# Patient Record
Sex: MALE | Race: WHITE | NOT HISPANIC OR LATINO | Employment: OTHER | URBAN - METROPOLITAN AREA
[De-identification: names, ages, dates, MRNs, and addresses within clinical notes are randomized per-mention and may not be internally consistent; named-entity substitution may affect disease eponyms.]

---

## 2023-02-21 LAB
CHOLESTEROL (MG/DL) IN SER/PLAS: 144 MG/DL (ref 0–199)
CHOLESTEROL IN HDL (MG/DL) IN SER/PLAS: 32.3 MG/DL
CHOLESTEROL/HDL RATIO: 4.5
LDL: 87 MG/DL (ref 0–99)
TRIGLYCERIDE (MG/DL) IN SER/PLAS: 123 MG/DL (ref 0–149)
VLDL: 25 MG/DL (ref 0–40)

## 2023-02-22 LAB
ESTIMATED AVERAGE GLUCOSE FOR HBA1C: 206 MG/DL
HEMOGLOBIN A1C/HEMOGLOBIN TOTAL IN BLOOD: 8.8 %

## 2023-09-12 LAB
ALANINE AMINOTRANSFERASE (SGPT) (U/L) IN SER/PLAS: 16 U/L (ref 10–52)
ALBUMIN (G/DL) IN SER/PLAS: 4.2 G/DL (ref 3.4–5)
ALKALINE PHOSPHATASE (U/L) IN SER/PLAS: 60 U/L (ref 33–136)
ANION GAP IN SER/PLAS: 10 MMOL/L (ref 10–20)
ASPARTATE AMINOTRANSFERASE (SGOT) (U/L) IN SER/PLAS: 16 U/L (ref 9–39)
BILIRUBIN TOTAL (MG/DL) IN SER/PLAS: 0.6 MG/DL (ref 0–1.2)
CALCIDIOL (25 OH VITAMIN D3) (NG/ML) IN SER/PLAS: 58 NG/ML
CALCIUM (MG/DL) IN SER/PLAS: 9.1 MG/DL (ref 8.6–10.3)
CARBON DIOXIDE, TOTAL (MMOL/L) IN SER/PLAS: 28 MMOL/L (ref 21–32)
CHLORIDE (MMOL/L) IN SER/PLAS: 103 MMOL/L (ref 98–107)
CHOLESTEROL (MG/DL) IN SER/PLAS: 114 MG/DL (ref 0–199)
CHOLESTEROL IN HDL (MG/DL) IN SER/PLAS: 28.5 MG/DL
CHOLESTEROL/HDL RATIO: 4
CREATININE (MG/DL) IN SER/PLAS: 0.81 MG/DL (ref 0.5–1.3)
ERYTHROCYTE DISTRIBUTION WIDTH (RATIO) BY AUTOMATED COUNT: 12.9 % (ref 11.5–14.5)
ERYTHROCYTE MEAN CORPUSCULAR HEMOGLOBIN CONCENTRATION (G/DL) BY AUTOMATED: 32.9 G/DL (ref 32–36)
ERYTHROCYTE MEAN CORPUSCULAR VOLUME (FL) BY AUTOMATED COUNT: 93 FL (ref 80–100)
ERYTHROCYTES (10*6/UL) IN BLOOD BY AUTOMATED COUNT: 4.61 X10E12/L (ref 4.5–5.9)
GFR MALE: 87 ML/MIN/1.73M2
GLUCOSE (MG/DL) IN SER/PLAS: 124 MG/DL (ref 74–99)
HEMATOCRIT (%) IN BLOOD BY AUTOMATED COUNT: 43.1 % (ref 41–52)
HEMOGLOBIN (G/DL) IN BLOOD: 14.2 G/DL (ref 13.5–17.5)
LDL: 70 MG/DL (ref 0–99)
LEUKOCYTES (10*3/UL) IN BLOOD BY AUTOMATED COUNT: 6.2 X10E9/L (ref 4.4–11.3)
PLATELETS (10*3/UL) IN BLOOD AUTOMATED COUNT: 315 X10E9/L (ref 150–450)
POTASSIUM (MMOL/L) IN SER/PLAS: 4.1 MMOL/L (ref 3.5–5.3)
PROTEIN TOTAL: 6.9 G/DL (ref 6.4–8.2)
SODIUM (MMOL/L) IN SER/PLAS: 137 MMOL/L (ref 136–145)
THYROTROPIN (MIU/L) IN SER/PLAS BY DETECTION LIMIT <= 0.05 MIU/L: 1.58 MIU/L (ref 0.44–3.98)
TRIGLYCERIDE (MG/DL) IN SER/PLAS: 76 MG/DL (ref 0–149)
UREA NITROGEN (MG/DL) IN SER/PLAS: 10 MG/DL (ref 6–23)
VLDL: 15 MG/DL (ref 0–40)

## 2023-09-13 LAB
ESTIMATED AVERAGE GLUCOSE FOR HBA1C: 169 MG/DL
HEMOGLOBIN A1C/HEMOGLOBIN TOTAL IN BLOOD: 7.5 %

## 2023-10-20 PROBLEM — R29.898 DECREASED RANGE OF MOTION OF NECK: Status: ACTIVE | Noted: 2023-10-20

## 2023-10-20 PROBLEM — R29.898 WEAKNESS OF EXTREMITY: Status: ACTIVE | Noted: 2023-10-20

## 2023-10-20 PROBLEM — R05.8 PRODUCTIVE COUGH: Status: ACTIVE | Noted: 2023-10-20

## 2023-10-20 RX ORDER — AMOXICILLIN AND CLAVULANATE POTASSIUM 875; 125 MG/1; MG/1
1 TABLET, FILM COATED ORAL EVERY 12 HOURS
COMMUNITY
Start: 2022-11-27 | End: 2023-10-24 | Stop reason: ALTCHOICE

## 2023-10-20 RX ORDER — ALBUTEROL SULFATE 90 UG/1
AEROSOL, METERED RESPIRATORY (INHALATION)
COMMUNITY
Start: 2022-11-27 | End: 2024-01-24 | Stop reason: ALTCHOICE

## 2023-10-24 ENCOUNTER — OFFICE VISIT (OUTPATIENT)
Dept: PRIMARY CARE | Facility: CLINIC | Age: 83
End: 2023-10-24
Payer: MEDICARE

## 2023-10-24 VITALS
DIASTOLIC BLOOD PRESSURE: 70 MMHG | RESPIRATION RATE: 16 BRPM | HEART RATE: 76 BPM | SYSTOLIC BLOOD PRESSURE: 118 MMHG | WEIGHT: 213 LBS | BODY MASS INDEX: 33.43 KG/M2 | HEIGHT: 67 IN | TEMPERATURE: 97.7 F

## 2023-10-24 DIAGNOSIS — E78.5 HYPERLIPIDEMIA, UNSPECIFIED HYPERLIPIDEMIA TYPE: ICD-10-CM

## 2023-10-24 DIAGNOSIS — G30.9 AD (ALZHEIMER'S DISEASE) (MULTI): Primary | ICD-10-CM

## 2023-10-24 DIAGNOSIS — Z95.1 S/P CABG X 3: ICD-10-CM

## 2023-10-24 DIAGNOSIS — E11.9 TYPE 2 DIABETES MELLITUS WITHOUT COMPLICATION, WITHOUT LONG-TERM CURRENT USE OF INSULIN (MULTI): ICD-10-CM

## 2023-10-24 DIAGNOSIS — I10 ESSENTIAL HYPERTENSION: ICD-10-CM

## 2023-10-24 DIAGNOSIS — F02.80 AD (ALZHEIMER'S DISEASE) (MULTI): Primary | ICD-10-CM

## 2023-10-24 DIAGNOSIS — I25.83 CORONARY ATHEROSCLEROSIS DUE TO LIPID RICH PLAQUE: ICD-10-CM

## 2023-10-24 DIAGNOSIS — I25.10 CORONARY ATHEROSCLEROSIS DUE TO LIPID RICH PLAQUE: ICD-10-CM

## 2023-10-24 PROCEDURE — 1159F MED LIST DOCD IN RCRD: CPT | Performed by: FAMILY MEDICINE

## 2023-10-24 PROCEDURE — 1160F RVW MEDS BY RX/DR IN RCRD: CPT | Performed by: FAMILY MEDICINE

## 2023-10-24 PROCEDURE — 3074F SYST BP LT 130 MM HG: CPT | Performed by: FAMILY MEDICINE

## 2023-10-24 PROCEDURE — 99204 OFFICE O/P NEW MOD 45 MIN: CPT | Performed by: FAMILY MEDICINE

## 2023-10-24 PROCEDURE — 3078F DIAST BP <80 MM HG: CPT | Performed by: FAMILY MEDICINE

## 2023-10-24 RX ORDER — ATORVASTATIN CALCIUM 40 MG/1
40 TABLET, FILM COATED ORAL
COMMUNITY
Start: 2019-12-31 | End: 2023-12-06 | Stop reason: SDUPTHER

## 2023-10-24 RX ORDER — ZINC GLUCONATE 50 MG
50 TABLET ORAL
COMMUNITY
End: 2023-10-24 | Stop reason: ALTCHOICE

## 2023-10-24 RX ORDER — RAMIPRIL 10 MG/1
10 CAPSULE ORAL
COMMUNITY
Start: 2018-02-22 | End: 2023-10-26 | Stop reason: SDUPTHER

## 2023-10-24 RX ORDER — ASPIRIN 81 MG/1
81 TABLET ORAL
COMMUNITY

## 2023-10-24 RX ORDER — MEMANTINE HYDROCHLORIDE 10 MG/1
10 TABLET ORAL 2 TIMES DAILY
COMMUNITY
Start: 2023-01-13 | End: 2023-10-26 | Stop reason: SDUPTHER

## 2023-10-24 RX ORDER — METFORMIN HYDROCHLORIDE 1000 MG/1
1000 TABLET ORAL 2 TIMES DAILY
COMMUNITY
Start: 2023-02-16 | End: 2023-12-06 | Stop reason: SDUPTHER

## 2023-10-24 RX ORDER — GLIPIZIDE 10 MG/1
10 TABLET, FILM COATED, EXTENDED RELEASE ORAL
COMMUNITY
Start: 2023-03-07 | End: 2023-12-06 | Stop reason: SDUPTHER
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
## 2023-10-25 ENCOUNTER — TELEPHONE (OUTPATIENT)
Dept: PHYSICAL THERAPY | Facility: CLINIC | Age: 83
End: 2023-10-25

## 2023-10-25 DIAGNOSIS — I10 HYPERTENSION, UNSPECIFIED TYPE: ICD-10-CM

## 2023-10-25 DIAGNOSIS — F03.90 DEMENTIA WITHOUT BEHAVIORAL DISTURBANCE, PSYCHOTIC DISTURBANCE, MOOD DISTURBANCE, OR ANXIETY, UNSPECIFIED DEMENTIA SEVERITY, UNSPECIFIED DEMENTIA TYPE (MULTI): Primary | ICD-10-CM

## 2023-10-25 NOTE — TELEPHONE ENCOUNTER
PATIENTS DAUGHTER CALLED TO LET YOU KNOW HOW MANY PILLS THE PATIENT HAS LEFT      GLIMEPIRIDE - 10mg - has 308 pills left     NAMENDA - 10mg - has 45 pills left     RAMIPRIL - 10mg - has 29 pills left     METFORMIN - 1000mg - has 100 pills left     ATORVASTATIN - 40mg - has 90 pills left

## 2023-10-26 RX ORDER — MEMANTINE HYDROCHLORIDE 10 MG/1
10 TABLET ORAL 2 TIMES DAILY
Qty: 180 TABLET | Refills: 1 | Status: SHIPPED | OUTPATIENT
Start: 2023-10-26 | End: 2023-12-06 | Stop reason: SDUPTHER

## 2023-10-26 RX ORDER — RAMIPRIL 10 MG/1
10 CAPSULE ORAL
Qty: 90 CAPSULE | Refills: 1 | Status: SHIPPED | OUTPATIENT
Start: 2023-10-26 | End: 2023-12-06 | Stop reason: SDUPTHER

## 2023-12-05 ENCOUNTER — PATIENT MESSAGE (OUTPATIENT)
Dept: PRIMARY CARE | Facility: CLINIC | Age: 83
End: 2023-12-05
Payer: MEDICARE

## 2023-12-05 DIAGNOSIS — F03.90 DEMENTIA WITHOUT BEHAVIORAL DISTURBANCE, PSYCHOTIC DISTURBANCE, MOOD DISTURBANCE, OR ANXIETY, UNSPECIFIED DEMENTIA SEVERITY, UNSPECIFIED DEMENTIA TYPE (MULTI): ICD-10-CM

## 2023-12-05 DIAGNOSIS — I10 HYPERTENSION, UNSPECIFIED TYPE: ICD-10-CM

## 2023-12-05 DIAGNOSIS — E11.9 TYPE 2 DIABETES MELLITUS WITHOUT COMPLICATION, WITHOUT LONG-TERM CURRENT USE OF INSULIN (MULTI): Primary | ICD-10-CM

## 2023-12-06 RX ORDER — ATORVASTATIN CALCIUM 40 MG/1
40 TABLET, FILM COATED ORAL
Qty: 90 TABLET | Refills: 1 | Status: SHIPPED | OUTPATIENT
Start: 2023-12-06 | End: 2024-01-25 | Stop reason: SDUPTHER

## 2023-12-06 RX ORDER — METFORMIN HYDROCHLORIDE 1000 MG/1
1000 TABLET ORAL
Qty: 180 TABLET | Refills: 1 | Status: SHIPPED | OUTPATIENT
Start: 2023-12-06 | End: 2024-01-25 | Stop reason: SDUPTHER

## 2023-12-06 RX ORDER — RAMIPRIL 10 MG/1
10 CAPSULE ORAL
Qty: 90 CAPSULE | Refills: 1 | Status: SHIPPED | OUTPATIENT
Start: 2023-12-06 | End: 2024-01-31 | Stop reason: SDUPTHER

## 2023-12-06 RX ORDER — GLIPIZIDE 10 MG/1
10 TABLET, FILM COATED, EXTENDED RELEASE ORAL
Qty: 90 TABLET | Refills: 1 | Status: SHIPPED | OUTPATIENT
Start: 2023-12-06 | End: 2024-01-25 | Stop reason: SDUPTHER

## 2023-12-06 RX ORDER — MEMANTINE HYDROCHLORIDE 10 MG/1
10 TABLET ORAL 2 TIMES DAILY
Qty: 180 TABLET | Refills: 1 | Status: SHIPPED | OUTPATIENT
Start: 2023-12-06 | End: 2024-01-26 | Stop reason: SDUPTHER

## 2024-01-05 DIAGNOSIS — G30.9 AD (ALZHEIMER'S DISEASE) (MULTI): ICD-10-CM

## 2024-01-05 DIAGNOSIS — F03.90 DEMENTIA WITHOUT BEHAVIORAL DISTURBANCE, PSYCHOTIC DISTURBANCE, MOOD DISTURBANCE, OR ANXIETY, UNSPECIFIED DEMENTIA SEVERITY, UNSPECIFIED DEMENTIA TYPE (MULTI): ICD-10-CM

## 2024-01-05 DIAGNOSIS — F02.80 AD (ALZHEIMER'S DISEASE) (MULTI): ICD-10-CM

## 2024-01-23 ENCOUNTER — PATIENT MESSAGE (OUTPATIENT)
Dept: PRIMARY CARE | Facility: CLINIC | Age: 84
End: 2024-01-23
Payer: MEDICARE

## 2024-01-23 DIAGNOSIS — I10 HYPERTENSION, UNSPECIFIED TYPE: ICD-10-CM

## 2024-01-24 ENCOUNTER — OFFICE VISIT (OUTPATIENT)
Dept: PRIMARY CARE | Facility: CLINIC | Age: 84
End: 2024-01-24
Payer: MEDICARE

## 2024-01-24 ENCOUNTER — LAB (OUTPATIENT)
Dept: LAB | Facility: LAB | Age: 84
End: 2024-01-24
Payer: MEDICARE

## 2024-01-24 VITALS
SYSTOLIC BLOOD PRESSURE: 126 MMHG | WEIGHT: 213 LBS | DIASTOLIC BLOOD PRESSURE: 77 MMHG | BODY MASS INDEX: 33.11 KG/M2 | RESPIRATION RATE: 16 BRPM | OXYGEN SATURATION: 93 % | TEMPERATURE: 97.5 F | HEART RATE: 82 BPM

## 2024-01-24 DIAGNOSIS — N30.00 ACUTE CYSTITIS WITHOUT HEMATURIA: ICD-10-CM

## 2024-01-24 DIAGNOSIS — F03.90 DEMENTIA WITHOUT BEHAVIORAL DISTURBANCE, PSYCHOTIC DISTURBANCE, MOOD DISTURBANCE, OR ANXIETY, UNSPECIFIED DEMENTIA SEVERITY, UNSPECIFIED DEMENTIA TYPE (MULTI): ICD-10-CM

## 2024-01-24 DIAGNOSIS — R80.9 PROTEINURIA, UNSPECIFIED TYPE: ICD-10-CM

## 2024-01-24 DIAGNOSIS — I10 ESSENTIAL HYPERTENSION: ICD-10-CM

## 2024-01-24 DIAGNOSIS — E11.9 TYPE 2 DIABETES MELLITUS WITHOUT COMPLICATION, WITHOUT LONG-TERM CURRENT USE OF INSULIN (MULTI): Primary | ICD-10-CM

## 2024-01-24 DIAGNOSIS — E11.9 TYPE 2 DIABETES MELLITUS WITHOUT COMPLICATION, WITHOUT LONG-TERM CURRENT USE OF INSULIN (MULTI): ICD-10-CM

## 2024-01-24 LAB
ALBUMIN SERPL BCP-MCNC: 4.4 G/DL (ref 3.4–5)
ALP SERPL-CCNC: 59 U/L (ref 33–136)
ALT SERPL W P-5'-P-CCNC: 14 U/L (ref 10–52)
ANION GAP SERPL CALC-SCNC: 14 MMOL/L (ref 10–20)
APPEARANCE UR: CLEAR
AST SERPL W P-5'-P-CCNC: 15 U/L (ref 9–39)
BILIRUB SERPL-MCNC: 1 MG/DL (ref 0–1.2)
BILIRUB UR STRIP.AUTO-MCNC: NEGATIVE MG/DL
BUN SERPL-MCNC: 13 MG/DL (ref 6–23)
CALCIUM SERPL-MCNC: 9.4 MG/DL (ref 8.6–10.3)
CHLORIDE SERPL-SCNC: 99 MMOL/L (ref 98–107)
CHOLEST SERPL-MCNC: 154 MG/DL (ref 0–199)
CHOLESTEROL/HDL RATIO: 4.7
CO2 SERPL-SCNC: 28 MMOL/L (ref 21–32)
COLOR UR: YELLOW
CREAT SERPL-MCNC: 0.91 MG/DL (ref 0.5–1.3)
CREAT UR-MCNC: 162.6 MG/DL (ref 20–370)
EGFRCR SERPLBLD CKD-EPI 2021: 83 ML/MIN/1.73M*2
ERYTHROCYTE [DISTWIDTH] IN BLOOD BY AUTOMATED COUNT: 13.1 % (ref 11.5–14.5)
EST. AVERAGE GLUCOSE BLD GHB EST-MCNC: 163 MG/DL
GLUCOSE SERPL-MCNC: 130 MG/DL (ref 74–99)
GLUCOSE UR STRIP.AUTO-MCNC: NEGATIVE MG/DL
HBA1C MFR BLD: 7.3 %
HCT VFR BLD AUTO: 46.6 % (ref 41–52)
HDLC SERPL-MCNC: 32.8 MG/DL
HGB BLD-MCNC: 15.1 G/DL (ref 13.5–17.5)
KETONES UR STRIP.AUTO-MCNC: ABNORMAL MG/DL
LDLC SERPL CALC-MCNC: 96 MG/DL
LEUKOCYTE ESTERASE UR QL STRIP.AUTO: NEGATIVE
MCH RBC QN AUTO: 30.8 PG (ref 26–34)
MCHC RBC AUTO-ENTMCNC: 32.4 G/DL (ref 32–36)
MCV RBC AUTO: 95 FL (ref 80–100)
MICROALBUMIN UR-MCNC: 20.1 MG/L
MICROALBUMIN/CREAT UR: 12.4 UG/MG CREAT
MUCOUS THREADS #/AREA URNS AUTO: NORMAL /LPF
NITRITE UR QL STRIP.AUTO: NEGATIVE
NON HDL CHOLESTEROL: 121 MG/DL (ref 0–149)
NRBC BLD-RTO: 0 /100 WBCS (ref 0–0)
PH UR STRIP.AUTO: 5 [PH]
PLATELET # BLD AUTO: 367 X10*3/UL (ref 150–450)
POTASSIUM SERPL-SCNC: 4.5 MMOL/L (ref 3.5–5.3)
PROT SERPL-MCNC: 7 G/DL (ref 6.4–8.2)
PROT UR STRIP.AUTO-MCNC: ABNORMAL MG/DL
RBC # BLD AUTO: 4.91 X10*6/UL (ref 4.5–5.9)
RBC # UR STRIP.AUTO: NEGATIVE /UL
RBC #/AREA URNS AUTO: NORMAL /HPF
SODIUM SERPL-SCNC: 136 MMOL/L (ref 136–145)
SP GR UR STRIP.AUTO: 1.02
TRIGL SERPL-MCNC: 128 MG/DL (ref 0–149)
TSH SERPL-ACNC: 1.35 MIU/L (ref 0.44–3.98)
UROBILINOGEN UR STRIP.AUTO-MCNC: 2 MG/DL
VLDL: 26 MG/DL (ref 0–40)
WBC # BLD AUTO: 8.5 X10*3/UL (ref 4.4–11.3)
WBC #/AREA URNS AUTO: NORMAL /HPF

## 2024-01-24 PROCEDURE — 85027 COMPLETE CBC AUTOMATED: CPT

## 2024-01-24 PROCEDURE — 3074F SYST BP LT 130 MM HG: CPT | Performed by: FAMILY MEDICINE

## 2024-01-24 PROCEDURE — 80053 COMPREHEN METABOLIC PANEL: CPT

## 2024-01-24 PROCEDURE — 80061 LIPID PANEL: CPT

## 2024-01-24 PROCEDURE — 84443 ASSAY THYROID STIM HORMONE: CPT

## 2024-01-24 PROCEDURE — 84165 PROTEIN E-PHORESIS SERUM: CPT | Performed by: FAMILY MEDICINE

## 2024-01-24 PROCEDURE — 87086 URINE CULTURE/COLONY COUNT: CPT

## 2024-01-24 PROCEDURE — 1157F ADVNC CARE PLAN IN RCRD: CPT | Performed by: FAMILY MEDICINE

## 2024-01-24 PROCEDURE — 36415 COLL VENOUS BLD VENIPUNCTURE: CPT

## 2024-01-24 PROCEDURE — 3078F DIAST BP <80 MM HG: CPT | Performed by: FAMILY MEDICINE

## 2024-01-24 PROCEDURE — 84165 PROTEIN E-PHORESIS SERUM: CPT

## 2024-01-24 PROCEDURE — 83036 HEMOGLOBIN GLYCOSYLATED A1C: CPT

## 2024-01-24 PROCEDURE — 82043 UR ALBUMIN QUANTITATIVE: CPT

## 2024-01-24 PROCEDURE — 1160F RVW MEDS BY RX/DR IN RCRD: CPT | Performed by: FAMILY MEDICINE

## 2024-01-24 PROCEDURE — 82570 ASSAY OF URINE CREATININE: CPT

## 2024-01-24 PROCEDURE — 81001 URINALYSIS AUTO W/SCOPE: CPT

## 2024-01-24 PROCEDURE — 1159F MED LIST DOCD IN RCRD: CPT | Performed by: FAMILY MEDICINE

## 2024-01-24 PROCEDURE — 99214 OFFICE O/P EST MOD 30 MIN: CPT | Performed by: FAMILY MEDICINE

## 2024-01-24 PROCEDURE — 84166 PROTEIN E-PHORESIS/URINE/CSF: CPT | Performed by: FAMILY MEDICINE

## 2024-01-24 PROCEDURE — 84166 PROTEIN E-PHORESIS/URINE/CSF: CPT

## 2024-01-24 RX ORDER — AMOXICILLIN 500 MG/1
CAPSULE ORAL
COMMUNITY
Start: 2024-01-22 | End: 2024-03-06 | Stop reason: WASHOUT

## 2024-01-25 DIAGNOSIS — E11.9 TYPE 2 DIABETES MELLITUS WITHOUT COMPLICATION, WITHOUT LONG-TERM CURRENT USE OF INSULIN (MULTI): ICD-10-CM

## 2024-01-25 DIAGNOSIS — R80.9 PROTEINURIA, UNSPECIFIED TYPE: Primary | ICD-10-CM

## 2024-01-25 DIAGNOSIS — I10 HYPERTENSION, UNSPECIFIED TYPE: ICD-10-CM

## 2024-01-25 DIAGNOSIS — F03.90 DEMENTIA WITHOUT BEHAVIORAL DISTURBANCE, PSYCHOTIC DISTURBANCE, MOOD DISTURBANCE, OR ANXIETY, UNSPECIFIED DEMENTIA SEVERITY, UNSPECIFIED DEMENTIA TYPE (MULTI): ICD-10-CM

## 2024-01-25 LAB
BACTERIA UR CULT: NO GROWTH
PROT SERPL-MCNC: 7.4 G/DL (ref 6.4–8.2)
PROT UR-ACNC: 20 MG/DL (ref 5–25)

## 2024-01-25 RX ORDER — METFORMIN HYDROCHLORIDE 1000 MG/1
1000 TABLET ORAL
Qty: 180 TABLET | Refills: 1 | Status: SHIPPED | OUTPATIENT
Start: 2024-01-25 | End: 2024-06-05

## 2024-01-25 RX ORDER — GLIPIZIDE 10 MG/1
10 TABLET, FILM COATED, EXTENDED RELEASE ORAL
Qty: 90 TABLET | Refills: 1 | Status: SHIPPED | OUTPATIENT
Start: 2024-01-25 | End: 2024-06-05

## 2024-01-25 RX ORDER — ATORVASTATIN CALCIUM 40 MG/1
40 TABLET, FILM COATED ORAL
Qty: 90 TABLET | Refills: 1 | Status: SHIPPED | OUTPATIENT
Start: 2024-01-25 | End: 2024-06-05

## 2024-01-26 DIAGNOSIS — F03.90 DEMENTIA WITHOUT BEHAVIORAL DISTURBANCE, PSYCHOTIC DISTURBANCE, MOOD DISTURBANCE, OR ANXIETY, UNSPECIFIED DEMENTIA SEVERITY, UNSPECIFIED DEMENTIA TYPE (MULTI): ICD-10-CM

## 2024-01-26 RX ORDER — MEMANTINE HYDROCHLORIDE 10 MG/1
10 TABLET ORAL 2 TIMES DAILY
Qty: 180 TABLET | Refills: 1 | Status: SHIPPED | OUTPATIENT
Start: 2024-01-26 | End: 2024-07-24

## 2024-01-28 LAB
ALBUMIN MFR UR ELPH: 30.9 %
ALPHA1 GLOB MFR UR ELPH: 10.4 %
ALPHA2 GLOB MFR UR ELPH: 22.7 %
B-GLOBULIN MFR UR ELPH: 21.1 %
GAMMA GLOB MFR UR ELPH: 14.9 %
PATH REVIEW-URINE PROTEIN ELECTROPHORESIS: NORMAL
URINE ELECTROPHORESIS COMMENT: NORMAL

## 2024-01-29 LAB
ALBUMIN: 4.3 G/DL (ref 3.4–5)
ALPHA 1 GLOBULIN: 0.4 G/DL (ref 0.2–0.6)
ALPHA 2 GLOBULIN: 1 G/DL (ref 0.4–1.1)
BETA GLOBULIN: 0.9 G/DL (ref 0.5–1.2)
GAMMA GLOBULIN: 0.8 G/DL (ref 0.5–1.4)
PATH REVIEW-SERUM PROTEIN ELECTROPHORESIS: NORMAL
PROTEIN ELECTROPHORESIS COMMENT: NORMAL

## 2024-01-30 ENCOUNTER — PATIENT MESSAGE (OUTPATIENT)
Dept: PRIMARY CARE | Facility: CLINIC | Age: 84
End: 2024-01-30
Payer: MEDICARE

## 2024-01-30 DIAGNOSIS — Z79.4 TYPE 2 DIABETES MELLITUS WITH OTHER CIRCULATORY COMPLICATION, WITH LONG-TERM CURRENT USE OF INSULIN (MULTI): Primary | ICD-10-CM

## 2024-01-30 DIAGNOSIS — R61 DIAPHORESIS: Primary | ICD-10-CM

## 2024-01-30 DIAGNOSIS — E11.59 TYPE 2 DIABETES MELLITUS WITH OTHER CIRCULATORY COMPLICATION, WITH LONG-TERM CURRENT USE OF INSULIN (MULTI): Primary | ICD-10-CM

## 2024-01-31 ENCOUNTER — APPOINTMENT (OUTPATIENT)
Dept: NEUROLOGY | Facility: HOSPITAL | Age: 84
End: 2024-01-31
Payer: MEDICARE

## 2024-01-31 RX ORDER — RAMIPRIL 10 MG/1
10 CAPSULE ORAL
Qty: 90 CAPSULE | Refills: 1 | Status: SHIPPED | OUTPATIENT
Start: 2024-01-31 | End: 2024-07-29

## 2024-02-05 ENCOUNTER — HOSPITAL ENCOUNTER (OUTPATIENT)
Dept: RADIOLOGY | Facility: HOSPITAL | Age: 84
Discharge: HOME | End: 2024-02-05
Payer: MEDICARE

## 2024-02-05 ENCOUNTER — APPOINTMENT (OUTPATIENT)
Dept: LAB | Facility: LAB | Age: 84
End: 2024-02-05
Payer: MEDICARE

## 2024-02-05 DIAGNOSIS — R61 DIAPHORESIS: ICD-10-CM

## 2024-02-05 PROCEDURE — 71046 X-RAY EXAM CHEST 2 VIEWS: CPT

## 2024-02-05 PROCEDURE — 71046 X-RAY EXAM CHEST 2 VIEWS: CPT | Performed by: RADIOLOGY

## 2024-02-07 ENCOUNTER — TELEPHONE (OUTPATIENT)
Dept: PRIMARY CARE | Facility: CLINIC | Age: 84
End: 2024-02-07
Payer: MEDICARE

## 2024-02-07 NOTE — TELEPHONE ENCOUNTER
----- Message from Champ Suarez MD sent at 2/7/2024  8:28 AM EST -----  No acute findings or concerning findings on the chest Xray

## 2024-02-09 RX ORDER — FLASH GLUCOSE SCANNING READER
EACH MISCELLANEOUS
Qty: 1 EACH | Refills: 0 | Status: SHIPPED | OUTPATIENT
Start: 2024-02-09 | End: 2024-03-10 | Stop reason: SDUPTHER

## 2024-02-09 RX ORDER — FLASH GLUCOSE SENSOR
KIT MISCELLANEOUS
Qty: 1 EACH | Refills: 0 | Status: SHIPPED | OUTPATIENT
Start: 2024-02-09 | End: 2024-03-10 | Stop reason: SDUPTHER

## 2024-03-06 ENCOUNTER — OFFICE VISIT (OUTPATIENT)
Dept: ENDOCRINOLOGY | Facility: CLINIC | Age: 84
End: 2024-03-06
Payer: MEDICARE

## 2024-03-06 VITALS
WEIGHT: 215.6 LBS | HEIGHT: 68 IN | BODY MASS INDEX: 32.67 KG/M2 | HEART RATE: 84 BPM | SYSTOLIC BLOOD PRESSURE: 114 MMHG | DIASTOLIC BLOOD PRESSURE: 60 MMHG

## 2024-03-06 DIAGNOSIS — E11.9 TYPE 2 DIABETES MELLITUS WITHOUT COMPLICATION, WITHOUT LONG-TERM CURRENT USE OF INSULIN (MULTI): ICD-10-CM

## 2024-03-06 DIAGNOSIS — E11.59 TYPE 2 DIABETES MELLITUS WITH OTHER CIRCULATORY COMPLICATION, WITH LONG-TERM CURRENT USE OF INSULIN (MULTI): ICD-10-CM

## 2024-03-06 DIAGNOSIS — Z79.4 TYPE 2 DIABETES MELLITUS WITH OTHER CIRCULATORY COMPLICATION, WITH LONG-TERM CURRENT USE OF INSULIN (MULTI): ICD-10-CM

## 2024-03-06 LAB — POC FINGERSTICK BLOOD GLUCOSE: 128 MG/DL (ref 70–100)

## 2024-03-06 PROCEDURE — 1159F MED LIST DOCD IN RCRD: CPT | Performed by: INTERNAL MEDICINE

## 2024-03-06 PROCEDURE — 3078F DIAST BP <80 MM HG: CPT | Performed by: INTERNAL MEDICINE

## 2024-03-06 PROCEDURE — 99204 OFFICE O/P NEW MOD 45 MIN: CPT | Performed by: INTERNAL MEDICINE

## 2024-03-06 PROCEDURE — 1160F RVW MEDS BY RX/DR IN RCRD: CPT | Performed by: INTERNAL MEDICINE

## 2024-03-06 PROCEDURE — 3074F SYST BP LT 130 MM HG: CPT | Performed by: INTERNAL MEDICINE

## 2024-03-06 PROCEDURE — 82962 GLUCOSE BLOOD TEST: CPT | Performed by: INTERNAL MEDICINE

## 2024-03-06 PROCEDURE — 1157F ADVNC CARE PLAN IN RCRD: CPT | Performed by: INTERNAL MEDICINE

## 2024-03-06 ASSESSMENT — ENCOUNTER SYMPTOMS
CHEST TIGHTNESS: 0
SHORTNESS OF BREATH: 0

## 2024-03-06 NOTE — PATIENT INSTRUCTIONS
Thank you for choosing [facility]  for your health care needs.  If you have any questions, concerns or medical needs, please feel free to contact our office at [phone].    Please ensure you complete your blood work one week before the next scheduled appointment.    To continue Metfomrin 1000mg twice daily   To continue Glipizide 10mg once daily   For a diabetic dilated eye examination  Will try to get a FreeStyle Maged on the account of Alzheimer's   For follow up in 6 months

## 2024-03-06 NOTE — PROGRESS NOTES
Patient is sent at the request of Champ Suarez MD for my opinion regarding Type 2 diabetes.  My final recommendations will be communicated back to the requesting provider by way of shared medical record.    Subjective   Adonis Escalona is a 83 y.o. male who presents for initial visit for evaluation of Type 2 diabetes mellitus. He is accompanied by his daughter.    He was previously having episodes of profuse sweating.  His daughters lives one hour away and now has been involved in his health care.      His PCP tried to get a FreeStyle CGM but this was denied by insurance and thus he was referred here.    Known complications due to diabetes included CAD s/p CABG    Cardiovascular risk factors include advanced age (older than 55 for men, 65 for women), diabetes mellitus, hypertension, male gender, and obesity (BMI >= 30 kg/m2). The patient is on an ACE inhibitor or angiotensin II receptor blocker.  The patient has not been previously hospitalized due to diabetic ketoacidosis.     Current symptoms/problems include none. His clinical course has been stable.     Current diabetes regimen is as follows:   Glipizide 10mg once daily   Metformin 1000mg twice daily     The patient is not currently checking the blood glucose.    Patient is using: glucometer    Hypoglycemia frequency: No objective evidence   Hypoglycemia awareness: Yes; allege symptoms previously but not corroborated by objective data     Exercise: never     MEALS:  Breakfast - eggs, wing, can go out   Lunch -   Dinner - goes out often   Snacks - rice krispie treats  Beverages - water, juice      Review of Systems   HENT:  Positive for dental problem, hearing loss and tinnitus.    Eyes:  Positive for visual disturbance.   Respiratory:  Negative for chest tightness and shortness of breath.    Cardiovascular:  Negative for chest pain.   Genitourinary:         Nocturia x 1     Lack of erection    Musculoskeletal:  Positive for arthralgias (Hand) and neck pain.  "  Neurological:         Memory loss secondary to dementia    Psychiatric/Behavioral:  Negative for confusion.    All other systems reviewed and are negative.      Objective   /60 (BP Location: Right arm, Patient Position: Sitting, BP Cuff Size: Adult)   Pulse 84   Ht 1.734 m (5' 8.25\")   Wt 97.8 kg (215 lb 9.6 oz)   BMI 32.54 kg/m²   Physical Exam  Vitals and nursing note reviewed.   Constitutional:       General: He is not in acute distress.     Appearance: Normal appearance. He is normal weight.   HENT:      Head: Normocephalic and atraumatic.      Nose: Nose normal.      Mouth/Throat:      Mouth: Mucous membranes are moist.   Eyes:      Extraocular Movements: Extraocular movements intact.   Cardiovascular:      Rate and Rhythm: Normal rate and regular rhythm.   Pulmonary:      Effort: Pulmonary effort is normal.      Breath sounds: Normal breath sounds.   Musculoskeletal:         General: Normal range of motion.      Right foot: Normal range of motion. No deformity.      Left foot: Normal range of motion. No deformity.   Feet:      Right foot:      Skin integrity: Skin integrity normal. No ulcer or blister.      Toenail Condition: Right toenails are long.      Left foot:      Skin integrity: Skin integrity normal. No ulcer or blister.      Toenail Condition: Left toenails are long.      Comments: Yellowed toenails   Skin:     General: Skin is warm.   Neurological:      Mental Status: He is alert.   Psychiatric:         Mood and Affect: Mood normal.         Lab Review  Glucose (mg/dL)   Date Value   01/24/2024 130 (H)   09/12/2023 124 (H)   12/08/2022 309 (H)   05/31/2022 175 (H)     Hemoglobin A1C (%)   Date Value   01/24/2024 7.3 (H)   09/12/2023 7.5 (A)   02/21/2023 8.8 (A)   12/08/2022 12.0 (A)     Bicarbonate (mmol/L)   Date Value   01/24/2024 28   09/12/2023 28   12/08/2022 27   05/31/2022 29     Urea Nitrogen (mg/dL)   Date Value   01/24/2024 13   09/12/2023 10   12/08/2022 13   05/31/2022 17 "     Creatinine (mg/dL)   Date Value   01/24/2024 0.91   09/12/2023 0.81   12/08/2022 0.88   05/31/2022 0.92     Lab Results   Component Value Date    CHOL 154 01/24/2024    CHOL 114 09/12/2023    CHOL 144 02/21/2023     Lab Results   Component Value Date    HDL 32.8 01/24/2024    HDL 28.5 (A) 09/12/2023    HDL 32.3 (A) 02/21/2023     Lab Results   Component Value Date    LDLCALC 96 01/24/2024     Lab Results   Component Value Date    TRIG 128 01/24/2024    TRIG 76 09/12/2023    TRIG 123 02/21/2023     Health Maintenance:   Foot Exam:  March 6, 2024  Eye Exam: Lasix five years ago   Urine Albumin:  January 24, 2024    Assessment/Plan   83 year old male presents for the evaluation and further management of type II DM.  His blood pressure is at goal.    Type 2 diabetes mellitus, without long-term current use of insulin (CMS/[facility])  To continue Metfomrin 1000mg twice daily   To continue Glipizide 10mg once daily   For a diabetic dilated eye examination  Will try to get a FreeStyle Maged on the account of Alzheimer's, however, Medicare guidelines is that this is a coverable item if on insulin therapy   Counseled that the hemoglobin A1c value is at goal  For follow up in 6 months

## 2024-03-10 PROBLEM — E11.9 TYPE 2 DIABETES MELLITUS, WITHOUT LONG-TERM CURRENT USE OF INSULIN (MULTI): Status: ACTIVE | Noted: 2024-03-10

## 2024-03-10 RX ORDER — FLASH GLUCOSE SENSOR
KIT MISCELLANEOUS
Qty: 2 EACH | Refills: 11 | Status: SHIPPED | OUTPATIENT
Start: 2024-03-10

## 2024-03-10 RX ORDER — FLASH GLUCOSE SCANNING READER
EACH MISCELLANEOUS
Qty: 1 EACH | Refills: 0 | Status: SHIPPED | OUTPATIENT
Start: 2024-03-10

## 2024-03-10 ASSESSMENT — ENCOUNTER SYMPTOMS
CONFUSION: 0
ARTHRALGIAS: 1
NECK PAIN: 1

## 2024-03-10 NOTE — ASSESSMENT & PLAN NOTE
To continue Metfomrin 1000mg twice daily   To continue Glipizide 10mg once daily   For a diabetic dilated eye examination  Will try to get a FreeStyle Maged on the account of Alzheimer's, however, Medicare guidelines is that this is a coverable item if on insulin therapy   Counseled that the hemoglobin A1c value is at goal  For follow up in 6 months

## 2024-03-13 ENCOUNTER — TELEPHONE (OUTPATIENT)
Dept: ENDOCRINOLOGY | Facility: CLINIC | Age: 84
End: 2024-03-13
Payer: MEDICARE

## 2024-03-13 NOTE — TELEPHONE ENCOUNTER
Message sent back VIA [employer] to advise that a prescription was already sent to [facility] on 3/10/2024.

## 2024-03-13 NOTE — TELEPHONE ENCOUNTER
----- Message from Adonis Escalona sent at 3/12/2024  7:23 PM EDT -----  Regarding: Diabetes Prescriptions  Contact: [phone]  [facility]. So [facility] says that my Dad will need a new prescription called in from you after April 2 [for his Metformin and Glipizide]. In stranger news, it seems they have shipped Dad the [employer]. They say according to their records, it was pre-approved in February and they have not heard anything about it being cancelled. ??? Good news I guess.
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
## 2024-04-15 ENCOUNTER — OFFICE VISIT (OUTPATIENT)
Dept: GERIATRIC MEDICINE | Facility: HOSPITAL | Age: 84
End: 2024-04-15
Payer: MEDICARE

## 2024-04-15 VITALS
RESPIRATION RATE: 18 BRPM | SYSTOLIC BLOOD PRESSURE: 156 MMHG | OXYGEN SATURATION: 97 % | DIASTOLIC BLOOD PRESSURE: 87 MMHG | HEART RATE: 79 BPM

## 2024-04-15 DIAGNOSIS — M79.89 LEG SWELLING: ICD-10-CM

## 2024-04-15 DIAGNOSIS — F03.90 DEMENTIA WITHOUT BEHAVIORAL DISTURBANCE, PSYCHOTIC DISTURBANCE, MOOD DISTURBANCE, OR ANXIETY, UNSPECIFIED DEMENTIA SEVERITY, UNSPECIFIED DEMENTIA TYPE (MULTI): Primary | ICD-10-CM

## 2024-04-15 DIAGNOSIS — R05.8 PRODUCTIVE COUGH: ICD-10-CM

## 2024-04-15 DIAGNOSIS — I25.10 CORONARY ARTERY DISEASE INVOLVING NATIVE CORONARY ARTERY OF NATIVE HEART WITHOUT ANGINA PECTORIS: ICD-10-CM

## 2024-04-15 DIAGNOSIS — I10 ESSENTIAL HYPERTENSION: ICD-10-CM

## 2024-04-15 DIAGNOSIS — R41.844 EXECUTIVE FUNCTION DEFICIT: ICD-10-CM

## 2024-04-15 DIAGNOSIS — E11.69 TYPE 2 DIABETES MELLITUS WITH OTHER SPECIFIED COMPLICATION, WITHOUT LONG-TERM CURRENT USE OF INSULIN (MULTI): ICD-10-CM

## 2024-04-15 PROCEDURE — 99215 OFFICE O/P EST HI 40 MIN: CPT | Performed by: INTERNAL MEDICINE

## 2024-04-15 PROCEDURE — 3077F SYST BP >= 140 MM HG: CPT | Performed by: INTERNAL MEDICINE

## 2024-04-15 PROCEDURE — G2212 PROLONG OUTPT/OFFICE VIS: HCPCS | Performed by: INTERNAL MEDICINE

## 2024-04-15 PROCEDURE — 99417 PROLNG OP E/M EACH 15 MIN: CPT | Performed by: INTERNAL MEDICINE

## 2024-04-15 PROCEDURE — 1157F ADVNC CARE PLAN IN RCRD: CPT | Performed by: INTERNAL MEDICINE

## 2024-04-15 PROCEDURE — 3079F DIAST BP 80-89 MM HG: CPT | Performed by: INTERNAL MEDICINE

## 2024-04-15 PROCEDURE — 1159F MED LIST DOCD IN RCRD: CPT | Performed by: INTERNAL MEDICINE

## 2024-04-15 PROCEDURE — 1160F RVW MEDS BY RX/DR IN RCRD: CPT | Performed by: INTERNAL MEDICINE

## 2024-04-15 RX ORDER — DONEPEZIL HYDROCHLORIDE 10 MG/1
TABLET, FILM COATED ORAL
Qty: 30 TABLET | Refills: 1 | Status: SHIPPED | OUTPATIENT
Start: 2024-04-15 | End: 2024-04-26 | Stop reason: WASHOUT

## 2024-04-15 SDOH — ECONOMIC STABILITY: FOOD INSECURITY: WITHIN THE PAST 12 MONTHS, THE FOOD YOU BOUGHT JUST DIDN'T LAST AND YOU DIDN'T HAVE MONEY TO GET MORE.: NEVER TRUE

## 2024-04-15 SDOH — ECONOMIC STABILITY: FOOD INSECURITY: WITHIN THE PAST 12 MONTHS, YOU WORRIED THAT YOUR FOOD WOULD RUN OUT BEFORE YOU GOT MONEY TO BUY MORE.: NEVER TRUE

## 2024-04-15 ASSESSMENT — COLUMBIA-SUICIDE SEVERITY RATING SCALE - C-SSRS
2. HAVE YOU ACTUALLY HAD ANY THOUGHTS OF KILLING YOURSELF?: NO
6. HAVE YOU EVER DONE ANYTHING, STARTED TO DO ANYTHING, OR PREPARED TO DO ANYTHING TO END YOUR LIFE?: NO
1. IN THE PAST MONTH, HAVE YOU WISHED YOU WERE DEAD OR WISHED YOU COULD GO TO SLEEP AND NOT WAKE UP?: NO

## 2024-04-15 ASSESSMENT — MONTREAL COGNITIVE ASSESSMENT (MOCA)
6. READ LIST OF DIGITS [FORWARD/BACKWARD]: 1
11. FOR EACH PAIR OF WORDS, WHAT CATEGORY DO THEY BELONG TO (OUT OF 2): 1
WHAT LEVEL OF EDUCATION WAS ATTAINED: 1
VISUOSPATIAL/EXECUTIVE SUBSCORE: 0
7. [VIGILENCE] TAP WHEN HEARING DESIGNATED LETTER: 1
WHAT IS THE TOTAL SCORE (OUT OF 30): 12
9. REPEAT EACH SENTENCE: 0
12. MEMORY INDEX SCORE: 0
13. ORIENTATION SUBSCORE: 5
10. [FLUENCY] NAME WORDS STARTING WITH DESIGNATED LETTER: 0
4. NAME EACH OF THE THREE ANIMALS SHOWN: 2
8. SERIAL SUBTRACTION OF 7S: 1
5. MEMORY TRIALS: 0

## 2024-04-15 ASSESSMENT — CLOCK DRAWING TEST (CDT)
QUADRANTS IN THE CORRECT LOCATION: UNSUCCESSFUL
TWO HANDS EXIST ON THE CLOCK: SUCCESSFUL
NUMBERS IN THE CORRECT LOCATION: UNSUCCESSFUL
CORRECT TIME WAS DRAWN: UNSUCCESSFUL
DIVIDED: SUCCESSFUL
TOTAL SCORE: 2

## 2024-04-15 ASSESSMENT — ENCOUNTER SYMPTOMS
LOSS OF SENSATION IN FEET: 0
OCCASIONAL FEELINGS OF UNSTEADINESS: 0
DEPRESSION: 0

## 2024-04-15 ASSESSMENT — PATIENT HEALTH QUESTIONNAIRE - PHQ9
SUM OF ALL RESPONSES TO PHQ9 QUESTIONS 1 AND 2: 0
2. FEELING DOWN, DEPRESSED OR HOPELESS: NOT AT ALL
1. LITTLE INTEREST OR PLEASURE IN DOING THINGS: NOT AT ALL

## 2024-04-15 NOTE — PATIENT INSTRUCTIONS
For your memory   A. Increase socialization:  - consider going to [employer] during the day;  B. Increase physical exercise:  - try walking 30 minutes 5 times per week  C. Increase mental stimulation:  - brain stimulating activities- crossword puzzles, sudoku, word searches, read books, craft, learn a new hobby, take a class  D. Mediterranean diet- fruits, vegetables, lean meats, and fish, omega 3 fatty fish    2. Monitor your blood pressures at home  - if pressures are consistently are above 150s on top, let dr. Suarez know    3. Schedule driving evaluation    Rehabilitation- Occupational therapy  [facility]  [id number]  Locations:  - [facility] [place] [facility]     [address] [facility], [address], [place] [address]  - [facility], [address] [place] [address]    4. For mucous in your throat  - start using flonase (fluticasone) nasal spray (over the counter)  - 2 sprays each nostril at bedtime     5. Given your history of smoking and ongoing cough  - you may want to talk to Dr. Suarez about getting lung function tests    6. Start donepezil 10mg  - 1/2 tab daily with food.  -  If tolerated after 1 month, increase to a full tab daily  - monitor for diarrhea, nausea, decreased appetite, nightmares, increased urinary frequency or runny nose     7. Follow up in 3 months

## 2024-04-24 ENCOUNTER — TELEPHONE (OUTPATIENT)
Dept: GERIATRIC MEDICINE | Facility: CLINIC | Age: 84
End: 2024-04-24
Payer: MEDICARE
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
## 2024-04-30 ENCOUNTER — APPOINTMENT (OUTPATIENT)
Dept: NEUROLOGY | Facility: HOSPITAL | Age: 84
End: 2024-04-30
Payer: MEDICARE

## 2024-04-30 ENCOUNTER — OFFICE VISIT (OUTPATIENT)
Dept: PRIMARY CARE | Facility: CLINIC | Age: 84
End: 2024-04-30
Payer: MEDICARE

## 2024-04-30 VITALS
BODY MASS INDEX: 33.06 KG/M2 | WEIGHT: 219 LBS | DIASTOLIC BLOOD PRESSURE: 78 MMHG | SYSTOLIC BLOOD PRESSURE: 117 MMHG | RESPIRATION RATE: 16 BRPM | HEART RATE: 75 BPM | TEMPERATURE: 97.7 F

## 2024-04-30 DIAGNOSIS — I10 HYPERTENSION, UNSPECIFIED TYPE: Primary | ICD-10-CM

## 2024-04-30 DIAGNOSIS — K59.00 CONSTIPATION, UNSPECIFIED CONSTIPATION TYPE: ICD-10-CM

## 2024-04-30 DIAGNOSIS — K64.9 HEMORRHOIDS, UNSPECIFIED HEMORRHOID TYPE: ICD-10-CM

## 2024-04-30 DIAGNOSIS — R60.0 LOWER LEG EDEMA: ICD-10-CM

## 2024-04-30 DIAGNOSIS — E11.69 TYPE 2 DIABETES MELLITUS WITH OTHER SPECIFIED COMPLICATION, UNSPECIFIED WHETHER LONG TERM INSULIN USE (MULTI): ICD-10-CM

## 2024-04-30 PROCEDURE — 1157F ADVNC CARE PLAN IN RCRD: CPT | Performed by: FAMILY MEDICINE

## 2024-04-30 PROCEDURE — 3074F SYST BP LT 130 MM HG: CPT | Performed by: FAMILY MEDICINE

## 2024-04-30 PROCEDURE — 1159F MED LIST DOCD IN RCRD: CPT | Performed by: FAMILY MEDICINE

## 2024-04-30 PROCEDURE — 99214 OFFICE O/P EST MOD 30 MIN: CPT | Performed by: FAMILY MEDICINE

## 2024-04-30 PROCEDURE — 1160F RVW MEDS BY RX/DR IN RCRD: CPT | Performed by: FAMILY MEDICINE

## 2024-04-30 PROCEDURE — 3078F DIAST BP <80 MM HG: CPT | Performed by: FAMILY MEDICINE

## 2024-04-30 RX ORDER — HYDROCORTISONE 25 MG/G
CREAM TOPICAL 2 TIMES DAILY
Qty: 28 G | Refills: 1 | Status: SHIPPED | OUTPATIENT
Start: 2024-04-30

## 2024-04-30 RX ORDER — POLYETHYLENE GLYCOL 3350 17 G/17G
17 POWDER, FOR SOLUTION ORAL DAILY
Qty: 100 EACH | Refills: 1 | Status: SHIPPED | OUTPATIENT
Start: 2024-04-30 | End: 2024-11-16

## 2024-04-30 RX ORDER — FUROSEMIDE 20 MG/1
TABLET ORAL
Qty: 30 TABLET | Refills: 2 | Status: SHIPPED | OUTPATIENT
Start: 2024-04-30

## 2024-04-30 NOTE — PROGRESS NOTES
Subjective   Patient ID: Adonis Escalona is a 83 y.o. male who presents for Follow-up (3 mon follow up /Hemorrhoids and leg swelling ).  HPI  Patient with hx of dementia   Presenting today with his daughter and partner for follow up   He established care with geriatics , and endocrinology   Sugars running better   No acute changes in mental health   He has couple of concerns today   Leg swelling on occasions   Without SOB or chest pain , orthopnea or PND     Also constipation and hemorrhoids , does not feel that he emptied his bowels after defecating, and also complaining of hemorrhoids.      Blood pressure seems to be a little higher.       Review of Systems    Past Medical History:   Diagnosis Date    Dementia (Multi)     Diabetes (Multi)     Hypertension        Past Surgical History:   Procedure Laterality Date    CORONARY ARTERY BYPASS GRAFT      LEG SURGERY Right       Social History     Socioeconomic History    Marital status:      Spouse name: None    Number of children: None    Years of education: None    Highest education level: None   Occupational History    None   Tobacco Use    Smoking status: Former     Types: Cigarettes    Smokeless tobacco: None   Vaping Use    Vaping status: None   Substance and Sexual Activity    Alcohol use: Not Currently    Drug use: Not Currently    Sexual activity: None   Other Topics Concern    None   Social History Narrative    None     Social Determinants of Health     Financial Resource Strain: Not on file   Food Insecurity: No Food Insecurity (4/15/2024)    Hunger Vital Sign     Worried About Running Out of Food in the Last Year: Never true     Ran Out of Food in the Last Year: Never true   Transportation Needs: Not on file   Physical Activity: Not on file   Stress: Not on file   Social Connections: Not on file   Intimate Partner Violence: Not on file   Housing Stability: Not on file      Family History   Problem Relation Name Age of Onset    Other (old age) Mother   "    Other (old age) Father         MEDICATIONS AND ALLERGIES:    ALLERGIES Gluten    MEDICATIONS   Current Outpatient Medications on File Prior to Visit   Medication Sig Dispense Refill    atorvastatin (Lipitor) 40 mg tablet Take 1 tablet (40 mg) by mouth once daily. 90 tablet 1    FreeStyle Maged 2 [place] misc Use as instructed 1 each 0    FreeStyle Maged 2 Sensor kit For continuous glucose monitoring.  Clement every 14 days 2 each 11    glipiZIDE XL (Glucotrol XL) 10 mg 24 hr tablet Take 1 tablet (10 mg) by mouth once daily. 90 tablet 1    memantine (Namenda) 10 mg tablet Take 1 tablet (10 mg) by mouth 2 times a day. 180 tablet 1    metFORMIN (Glucophage) 1,000 mg tablet Take 1 tablet (1,000 mg) by mouth 2 times a day with meals. 180 tablet 1    ramipril (Altace) 10 mg capsule Take 1 capsule (10 mg) by mouth once daily. 90 capsule 1    aspirin 81 mg EC tablet Take 1 tablet (81 mg) by mouth once daily.      [DISCONTINUED] donepezil (Aricept) 10 mg tablet 1/2 tab daily with food. If tolerated after 1 month, increase to a full tab daily 30 tablet 1     No current facility-administered medications on file prior to visit.        Objective   Visit Vitals  /78   Pulse 75   Temp 36.5 °C (97.7 °F) (Temporal)   Resp 16   Wt 99.3 kg (219 lb)   BMI 33.06 kg/m²   Smoking Status Former   BSA 2.19 m²          11/27/2022    12:29 PM 10/24/2023     1:22 PM 1/24/2024    10:52 AM 3/6/2024     3:21 PM 4/15/2024    11:05 AM 4/30/2024    10:09 AM 4/30/2024    10:45 AM   Vitals   Systolic 156 118 126 114 156 148 117   Diastolic 79 70 77 60 87 80 78   Heart Rate 73 76 82 84 79 75    Temp 36.8 °C (98.3 °F) 36.5 °C (97.7 °F) 36.4 °C (97.5 °F)   36.5 °C (97.7 °F)    Resp 16 16 16  18 16    Height (in)  1.708 m (5' 7.25\")  1.734 m (5' 8.25\")      Weight (lb)  213 213 215.6  219    BMI  33.11 kg/m2 33.11 kg/m2 32.54 kg/m2  33.06 kg/m2    BSA (m2)  2.14 m2 2.14 m2 2.17 m2  2.19 m2    Visit Report  Report Report Report Report Report Report "     Physical Exam  Constitutional:       Appearance: Normal appearance.   HENT:      Head: Normocephalic and atraumatic.   Eyes:      Pupils: Pupils are equal, round, and reactive to light.   Cardiovascular:      Rate and Rhythm: Normal rate.   Pulmonary:      Effort: Pulmonary effort is normal.   Musculoskeletal:         General: No swelling.      Cervical back: Normal range of motion.   Skin:     Coloration: Skin is not jaundiced.   Neurological:      General: No focal deficit present.      Mental Status: He is alert and oriented to person, place, and time.     External hemorrhoids without tendrenss or thrombosis noted on exam       1. Hypertension, unspecified type  Blood pressure low normal on repeat   Will avoid adding hydrochlorothiazide due to risk of hypotenstion   Advised to continue monitoring   - CBC and Auto Differential; Future  - Comprehensive metabolic panel; Future  - polyethylene glycol (Glycolax, Miralax) 17 gram packet; Take 17 g by mouth once daily. Mix 1 cap (17g) into 8 ounces of fluid.  Dispense: 100 each; Refill: 1  - hydrocortisone (Anusol-HC) 2.5 % rectal cream; Insert into the rectum 2 times a day. Use for 1 week  Dispense: 28 g; Refill: 1    2. Type 2 diabetes mellitus with other specified complication, unspecified whether long term insulin use (Multi)  Following with endocrionlogy   - CBC and Auto Differential; Future  - Comprehensive metabolic panel; Future  - polyethylene glycol (Glycolax, Miralax) 17 gram packet; Take 17 g by mouth once daily. Mix 1 cap (17g) into 8 ounces of fluid.  Dispense: 100 each; Refill: 1  - hydrocortisone (Anusol-HC) 2.5 % rectal cream; Insert into the rectum 2 times a day. Use for 1 week  Dispense: 28 g; Refill: 1    3. Lower leg edema  3+ lungs clear   No signs of heart failure   Advised to elevate legs   Cut down salt   Will add lasi to be takes PRN for 3 days at a time   Recheck labs is using it on regular bases.     - CBC and Auto Differential; Future  -  Comprehensive metabolic panel; Future  - furosemide (Lasix) 20 mg tablet; Use furosemide as needed for leg swelling or weight gain more than 5 pounds , use it for 3 days for each episode of leg swelling.  Dispense: 30 tablet; Refill: 2  - polyethylene glycol (Glycolax, Miralax) 17 gram packet; Take 17 g by mouth once daily. Mix 1 cap (17g) into 8 ounces of fluid.  Dispense: 100 each; Refill: 1  - hydrocortisone (Anusol-HC) 2.5 % rectal cream; Insert into the rectum 2 times a day. Use for 1 week  Dispense: 28 g; Refill: 1    4. Hemorrhoids, unspecified hemorrhoid type  On exam , without thrombosis   Will add mirlalx   And hydrocortisone cream   Contact us if not improving within 1 month   - polyethylene glycol (Glycolax, Miralax) 17 gram packet; Take 17 g by mouth once daily. Mix 1 cap (17g) into 8 ounces of fluid.  Dispense: 100 each; Refill: 1  - hydrocortisone (Anusol-HC) 2.5 % rectal cream; Insert into the rectum 2 times a day. Use for 1 week  Dispense: 28 g; Refill: 1    5. Constipation, unspecified constipation type  Will start miralax, advised to stay hydrated.   - polyethylene glycol (Glycolax, Miralax) 17 gram packet; Take 17 g by mouth once daily. Mix 1 cap (17g) into 8 ounces of fluid.  Dispense: 100 each; Refill: 1  - hydrocortisone (Anusol-HC) 2.5 % rectal cream; Insert into the rectum 2 times a day. Use for 1 week  Dispense: 28 g; Refill: 1

## 2024-05-01 ENCOUNTER — APPOINTMENT (OUTPATIENT)
Dept: NEUROLOGY | Facility: HOSPITAL | Age: 84
End: 2024-05-01
Payer: MEDICARE

## 2024-05-06 ENCOUNTER — TELEPHONE (OUTPATIENT)
Dept: GERIATRIC MEDICINE | Facility: CLINIC | Age: 84
End: 2024-05-06
Payer: MEDICARE

## 2024-05-08 ENCOUNTER — TELEPHONE (OUTPATIENT)
Dept: GERIATRIC MEDICINE | Facility: CLINIC | Age: 84
End: 2024-05-08
Payer: MEDICARE

## 2024-05-08 NOTE — TELEPHONE ENCOUNTER
Follow-up call to patient's daughter, Cori, noting the Expert Evaluation was completed and will be faxed and mailed directly to [facility]. Cori shared appreciation for the update.

## 2024-06-04 DIAGNOSIS — I10 HYPERTENSION, UNSPECIFIED TYPE: ICD-10-CM

## 2024-06-04 DIAGNOSIS — F03.90 DEMENTIA WITHOUT BEHAVIORAL DISTURBANCE, PSYCHOTIC DISTURBANCE, MOOD DISTURBANCE, OR ANXIETY, UNSPECIFIED DEMENTIA SEVERITY, UNSPECIFIED DEMENTIA TYPE (MULTI): ICD-10-CM

## 2024-06-04 DIAGNOSIS — E11.9 TYPE 2 DIABETES MELLITUS WITHOUT COMPLICATION, WITHOUT LONG-TERM CURRENT USE OF INSULIN (MULTI): ICD-10-CM

## 2024-06-05 RX ORDER — ATORVASTATIN CALCIUM 40 MG/1
40 TABLET, FILM COATED ORAL DAILY
Qty: 90 TABLET | Refills: 3 | Status: SHIPPED | OUTPATIENT
Start: 2024-06-05

## 2024-06-05 RX ORDER — METFORMIN HYDROCHLORIDE 1000 MG/1
1000 TABLET ORAL
Qty: 180 TABLET | Refills: 3 | Status: SHIPPED | OUTPATIENT
Start: 2024-06-05

## 2024-06-05 RX ORDER — GLIPIZIDE 10 MG/1
10 TABLET, FILM COATED, EXTENDED RELEASE ORAL DAILY
Qty: 90 TABLET | Refills: 3 | Status: SHIPPED | OUTPATIENT
Start: 2024-06-05

## 2024-06-16 NOTE — PROGRESS NOTES
"Subjective   Mr. Escalona is 83 y.o. year old male and here for f/u of moderate dementia, type 2 diabetes, HTN, CAD, leg swelling, productive cough Here with his partner marcie and pt's dtr Cori.    Last visit 4/15/24  Per pt discussion/summary:   \"For your memory   A. Increase socialization:  - consider going to [facility] during the day;  B. Increase physical exercise:  - try walking 30 minutes 5 times per week  C. Increase mental stimulation:  - brain stimulating activities- crossword puzzles, sudoku, word searches, read books, craft, learn a new hobby, take a class  D. Mediterranean diet- fruits, vegetables, lean meats, and fish, omega 3 fatty fish  2. Monitor your blood pressures at home  - if pressures are consistently are above 150s on top, let dr. Suarez know  3. Schedule driving evaluation    Rehabilitation- Occupational therapy  [facility]  [id number]  Locations:  - [facility] [place] [facility]     [address], [address], [place] [address]  - [facility], [address]. [place] [address]  4. For mucous in your throat  - start using flonase (fluticasone) nasal spray (over the counter)  - 2 sprays each nostril at bedtime   5. Given your history of smoking and ongoing cough  - you may want to talk to Dr. Suarez about getting lung function tests  6. Start donepezil 10mg  - 1/2 tab daily with food.  -  If tolerated after 1 month, increase to a full tab daily  - monitor for diarrhea, nausea, decreased appetite, nightmares, increased urinary frequency or runny nose   7. Follow up in 3 months\"     Per dtr in [facility] note  \"Hi Dr. Mcguire. I just didn't have the heart to correct Dad right in front of him. I currently handle every aspect of his life. When I became aware/involved with his affairs last fall, his bank account was being defrauded by several different persons, he was about to be taken to court by the zoning " "department, he had lost his homeowner's insurance, his cell phone service was being defrauded and he was being grosely overcharged by all of his utilities, he owed almost $17,000.00 in unpaid income tax, his  ex-wife was still listed as the beneficiary of his life insurance policy and investment portfolio, he had multiple hundreds of pills (missed, over the past two year period) still on hand, he sleeps in a recliner (rather than on a bed) and frequently wears the same clothes for several days in a row (until I mention it to him), he had dozens of cars/trucks, motorcycles, trailers, tractors, boats...(for which he had no keys and couldn't find their titles) and he sold several vehicles with wrong titles, he can never remember these items in his yard (when we have to discuss them) and he can no longer remember where to find a tool or how to work on his vehicles (so we auctioned everything), he frequently forgets words and even topics as he is speaking (inserting \"Bip bip bip bip bip bip bip bip\" to finish his sentences and almost killed himself driving uphill into oncoming traffic on his busy street. His girlfriend contends there is nothing wrong with his memory/thinking.    I have to re-explain things to Dad over and over until we get it completed, and he re-explains things to me that he has already told me about dozens of times. He sits completely quiet at family gatherings (past four years) when he used to be very vocal and social. When asked questions, he always looks at me and wants me to answer.  Also-- when I first began working with Dad, every time I needed him to describe/outline/discuss some area of his life that required our attention, he was so overwhelmed that he would lift his hands to cover his face as he tried to answer me. It was like every little thing made him want to break down. This symptom is actually gone now, but probably because everything in his life is being handled now.\"     Saw " "pcp 4/30/24  \"1. Hypertension, unspecified type  Blood pressure low normal on repeat   Will avoid adding hydrochlorothiazide due to risk of hypotenstion   Advised to continue monitoring   2. Type 2 diabetes mellitus with other specified complication, unspecified whether long term insulin use (Multi)  Following with endocrionlogy   3. Lower leg edema  3+ lungs clear   No signs of heart failure   Advised to elevate legs   Cut down salt   Will add lasi to be takes PRN for 3 days at a time   Recheck labs is using it on regular bases.   4. Hemorrhoids, unspecified hemorrhoid type  On exam , without thrombosis   Will add mirlalx   And hydrocortisone cream   Contact us if not improving within 1 month   5. Constipation, unspecified constipation type  Will start miralax, advised to stay hydrated. \"    Had driving evaluation 5/2- scored 24 on short blessed test. Consistent with severe impairment. Scored 257 sec on trail making part B. Did ok on test of road signs and driving rules.   Had on road assessment 5/7/24  \"RECOMMENDATION: 1. Patient to return to independent driving in local and familiar areas only when alone..  2. May drive on the freeway when another licensed  is with him to assist with navigation.  3. Reiterated the importance of tracking devices whether application based, apple airtag, or teen tracker device for safety.  4. Family to monitor driving skills and should there be a decline, patient should retire from driving.  5. Physician to monitor cognition and should there be further decline, patient should probably retire from driving.  6. IF AT FAULT CRASH, SIGNIFICANT FENDER WALKER, MOVING VIOLATION OR INCIDENT OF GETTING LOST IMMEDIATE CESSATION OF DRIVING IS INDICATED \"    HPI     Per patient and caregiver (obtained in addition due to ***)      Social history:   Worked at [employer] for 45 years. Started working at 17 years.   7th-8th grade education   6 kids   15+ grandkids  Has a partner marcie of 20 " years  - no alcohol   - used to smoke. Quit smoking when he quit drinking. Did a little marijuna in past. No cocaine     Living environment: lives in house with partner marcie. Marcie's dtr and son lives with them     Is dependant or requires assistance in the following BADL: no. Maybe a little lazy with shaving per pt  Is dependant or requires assistance in the following Instrumental ADL: still driving. Getting help with meds and finances. Makes coffee.          Medications reviewed and reconciled.     Objective   There were no vitals taken for this visit.  { VITALS:646645}    Physical Exam  Constitutional: No Acute Distress; Well Kempt  Eyes: PERRLA, EOMI  Ears: auditory canals clear, TM's +LR b/l  ENT: Pharynx clear, neck supple  Lymphatic: No anterior cervical, supraclavicular adenopathy  Cardiovascular: RRR, +S1, S2, no murmurs appreciated, physiologic JVD.  Extremities: no cyanosis, clubbing, or edema. Pedal pulses intact  Respiratory: clear without rales, rhonchi or wheezes  Gastrointestinal: +BS, soft, nontender  Genitourinary: not examined  Musculoskeletal: unremarkable  Integumentary: skin warm, no tenting, no remarkable lesions  Neurological: non-focal deficit. Get-up-and-go:   Gait: stable  Psychiatric: affect      Component  Ref Range & Units 2 mo ago  (1/24/24) 7 mo ago  (9/12/23) 1 yr ago  (2/21/23) 1 yr ago  (12/8/22) 1 yr ago  (4/26/22) 2 yr ago  (12/6/21) 2 yr ago  (9/29/21)   Hemoglobin A1C  see below % 7.3 High  7.5 Abnormal  R, CM 8.8 Abnormal  R, CM 12.0 Abnormal  R, CM 9.0 Abnormal  R, CM 7.8 Abnormal  R, CM 8.9 Abnormal  R, CM   Estimated Average Glucose  Not Established mg/dL 163 169 R 206 R 298 R 212 R 177 R 209 R      omponent  Ref Range & Units 2 mo ago  (1/24/24) 7 mo ago  (9/12/23) 1 yr ago  (12/8/22) 1 yr ago  (4/26/22) 2 yr ago  (9/29/21) 3 yr ago  (11/16/20) 3 yr ago  (6/2/20)   Thyroid Stimulating Hormone  0.44 - 3.98 mIU/L 1.35 1.58 CM 1.32 CM 1.10 CM 1.65 CM 1.57 CM 1.33 C       Component  Ref Range & Units 2 mo ago  (1/24/24) 7 mo ago  (9/12/23) 1 yr ago  (2/21/23) 1 yr ago  (12/8/22) 1 yr ago  (4/26/22) 2 yr ago  (9/29/21) 3 yr ago  (11/16/20)   Cholesterol  0 - 199 mg/dL 154 114   High     High  CM   HDL-Cholesterol  mg/dL 32.8 28.5 Abnormal  CM 32.3 Abnormal  CM 35.7 Abnormal  CM 34.2 Abnormal  CM 30.2 Abnormal  CM 39.3 Abnormal  CM   Cholesterol/HDL Ratio 4.7 4.0 CM 4.5 CM 6.3 Abnormal  CM 4.1 CM 4.9 CM 5.5 Abnormal  CM   LDL Calculated  <=99 mg/dL 96 70 R, CM 87 R,  High  R, CM 86 R, CM 96 R,  High  R, CM   VLDL  0 - 40 mg/dL 26 15 25 29 21 21 25   Triglycerides  0 - 149 mg/dL 128 76      CM      Component  Ref Range & Units 2 mo ago  (1/24/24) 7 mo ago  (9/12/23) 1 yr ago  (12/8/22) 1 yr ago  (5/31/22) 1 yr ago  (4/26/22) 2 yr ago  (9/29/21) 3 yr ago  (11/16/20)   Glucose  74 - 99 mg/dL 130 High  124 High  309 High  175 High  182 High  169 High  110 High    Sodium  136 - 145 mmol/L 136 137 134 Low  136 138 136 138   Potassium  3.5 - 5.3 mmol/L 4.5 4.1 4.4 4.1 4.2 4.5 4.4   Chloride  98 - 107 mmol/L 99 103 99 101 103 101 103   Bicarbonate  21 - 32 mmol/L 28 28 27 29 29 31 30   Anion Gap  10 - 20 mmol/L 14 10 12 10 10 9 Low  9 Low    Urea Nitrogen  6 - 23 mg/dL 13 10 13 17 13 18 14   Creatinine  0.50 - 1.30 mg/dL 0.91 0.81 0.88 0.92 0.94 0.98 0.95   eGFR  >60 mL/min/1.73m*2 83               Calcium  8.6 - 10.3 mg/dL 9.4 9.1 9.0 9.1 9.0 9.6 9.3   Albumin  3.4 - 5.0 g/dL 4.4 4.2 4.1 4.0 3.9 3.8 4.2   Alkaline Phosphatase  33 - 136 U/L 59 60 65 62 59 69 50   Total Protein  6.4 - 8.2 g/dL 7.0 6.9 6.6 6.7 6.4 7.0 7.0   AST  9 - 39 U/L 15 16 11 11 13 16 13   Bilirubin, Total  0.0 - 1.2 mg/dL 1.0 0.6 0.5 0.5 0.5 0.5 0.3   ALT  10 - 52 U/L 14 16 CM 15 CM 12 CM 13 CM 17 CM 10 CM            Lab Results   Component Value Date     TSH 1.35 01/24/2024     TSH 1.58 09/12/2023     TSH 1.32 12/08/2022      No results found for:  "\"FREET4\"        Lab Results   Component Value Date     CUYNVGBM09 >1504 (H) 12/08/2022     ABDNFHRG52 185 (L) 05/31/2022     YVSNFJLQ57 98 (L) 04/26/2022            Lab Results   Component Value Date     HGBA1C 7.3 (H) 01/24/2024     HGBA1C 7.5 (A) 09/12/2023     HGBA1C 8.8 (A) 02/21/2023            Lab Results   Component Value Date     VITD25 58 09/12/2023     VITD25 68 12/08/2022     VITD25 72 04/26/2022      Cxr 2/2024  \"CARDIOMEDIASTINAL SILHOUETTE:  Cardiomediastinal silhouette is stable in size and configuration.  Status post median sternotomy  LUNGS:  No consolidation, pneumothorax, or effusion.  ABDOMEN:  No remarkable upper abdominal findings.  BONES:  No acute osseous changes.  Remaining findings appear stable since prior comparison radiograph.  IMPRESSION:  1.  No evidence of acute cardiopulmonary process.\"    Lab Results   Component Value Date    TSH 1.35 01/24/2024    TSH 1.58 09/12/2023    TSH 1.32 12/08/2022     No results found for: \"FREET4\"  Lab Results   Component Value Date    FUMOLEBJ52 >1504 (H) 12/08/2022    LUFLESSW70 185 (L) 05/31/2022    MOWVHYTY40 98 (L) 04/26/2022     Lab Results   Component Value Date    HGBA1C 7.3 (H) 01/24/2024    HGBA1C 7.5 (A) 09/12/2023    HGBA1C 8.8 (A) 02/21/2023     Lab Results   Component Value Date    VITD25 58 09/12/2023    VITD25 68 12/08/2022    VITD25 72 04/26/2022        Assessment/Plan   1. Dementia without behavioral disturbance, psychotic disturbance, mood disturbance, or anxiety, unspecified dementia severity, unspecified dementia type (Multi)  2. Executive function deficit  Dtr notes that pt had cognitive changes 20 years ago. But had worsening about 3-4 years ago. He does repeat himself. And forgets words in conversation and confuses dates. Was not taking meds correctly. Dtr started filling pill box 7 months ago. She also took over finances. Pt is still driving. No reported issues. Independent with BADLs. Scored 12/30 on [employer] today, though with 8-9th " grade education. Diffuse deficits but 0/5 on recall (MIS 5/15) and 0/5 visuospatial/exeuctive functioning. Had MRI brain done in 5/2022 and EEG but unable to see results. Was started on memantine 10mg BID in past. Dtr notes that cognitive maybe improved over last 7 months since she took over meds. She thinks the memantine could be helping. But I suspect that blood control likely played a major role. 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  Will continue memantine for now. Will start trial of donepezil 5mg HS for 1 month. If tolerates will increase to 10mg daily. If any side effects or no noticeable benefit, will stop it. Discussed importance of mental stimulation, socialization and exercise and mediterranean diet.   - Referral to Occupational Therapy; Future  - donepezil (Aricept) 10 mg tablet; 1/2 tab daily with food. If tolerated after 1 month, increase to a full tab daily  Dispense: 30 tablet; Refill: 1  - Follow Up In Geriatrics; Future     3. Type 2 diabetes mellitus with other specified complication, without long-term current use of insulin (Multi)  - diabetes previously had been poorly controlled. A1c was 12 in 12/2022 and 8/8 in 2/2023. He had not been taking glipizide XL 10mg and metformin 1000mg bID consistently. His dtr took over med management about 7 months ago. And now A1c 7.3 in 1/2024.      4. Essential hypertension  5. Coronary artery disease involving native coronary artery of native heart without angina pectoris  6. Leg swelling  Has history of CAD s/p CABG. ECHO in 2019 showed EF 50-55%. BP today was elevated 150s -180s systolic. Though other Bps in the office this year have been 110s-120s systolic. Advised him to check bps at home. If consistently elevated, may benefit from addition of an antihypertensive. Does have 1+ pitting pedal edema. So hydrochlorothiazide could be an option.      7. Productive cough  Dtr notes that pt had URI  couple months ago. Has had a cough since then. Does seem to have some mucous in posterior oropharnyx. May have some rhinitis. Suggested starting fluticasone nasal spray. Also was a former smoker. Could consider PFTs    Sheron Mcguire MD

## 2024-06-17 ENCOUNTER — APPOINTMENT (OUTPATIENT)
Dept: GERIATRIC MEDICINE | Facility: HOSPITAL | Age: 84
End: 2024-06-17
Payer: MEDICARE

## 2024-07-15 ENCOUNTER — APPOINTMENT (OUTPATIENT)
Dept: GERIATRIC MEDICINE | Facility: HOSPITAL | Age: 84
End: 2024-07-15
Payer: MEDICARE

## 2024-07-16 ENCOUNTER — PATIENT MESSAGE (OUTPATIENT)
Dept: ENDOCRINOLOGY | Facility: CLINIC | Age: 84
End: 2024-07-16
Payer: MEDICARE

## 2024-07-16 NOTE — PATIENT COMMUNICATION
Cori has been contacted. Same request for refills was sent to Dr. Suarez's office who has historically prescribed medication. Clarification needed on who will be managing medication to avoid duplicates and/or confusion.     [place] [facility] has been added as preferred for future refills.

## 2024-07-18 ENCOUNTER — LAB (OUTPATIENT)
Dept: LAB | Facility: LAB | Age: 84
End: 2024-07-18
Payer: MEDICARE

## 2024-07-18 DIAGNOSIS — E11.69 TYPE 2 DIABETES MELLITUS WITH OTHER SPECIFIED COMPLICATION, UNSPECIFIED WHETHER LONG TERM INSULIN USE (MULTI): Primary | ICD-10-CM

## 2024-07-18 DIAGNOSIS — I10 HYPERTENSION, UNSPECIFIED TYPE: ICD-10-CM

## 2024-07-18 DIAGNOSIS — R60.0 LOWER LEG EDEMA: ICD-10-CM

## 2024-07-18 DIAGNOSIS — E11.69 TYPE 2 DIABETES MELLITUS WITH OTHER SPECIFIED COMPLICATION, UNSPECIFIED WHETHER LONG TERM INSULIN USE (MULTI): ICD-10-CM

## 2024-07-18 LAB
ALBUMIN SERPL BCP-MCNC: 4.2 G/DL (ref 3.4–5)
ALP SERPL-CCNC: 52 U/L (ref 33–136)
ALT SERPL W P-5'-P-CCNC: 10 U/L (ref 10–52)
ANION GAP SERPL CALC-SCNC: 13 MMOL/L (ref 10–20)
AST SERPL W P-5'-P-CCNC: 10 U/L (ref 9–39)
BASOPHILS # BLD AUTO: 0.05 X10*3/UL (ref 0–0.1)
BASOPHILS NFR BLD AUTO: 0.7 %
BILIRUB SERPL-MCNC: 0.4 MG/DL (ref 0–1.2)
BUN SERPL-MCNC: 11 MG/DL (ref 6–23)
CALCIUM SERPL-MCNC: 9.2 MG/DL (ref 8.6–10.3)
CHLORIDE SERPL-SCNC: 100 MMOL/L (ref 98–107)
CO2 SERPL-SCNC: 29 MMOL/L (ref 21–32)
CREAT SERPL-MCNC: 0.85 MG/DL (ref 0.5–1.3)
EGFRCR SERPLBLD CKD-EPI 2021: 86 ML/MIN/1.73M*2
EOSINOPHIL # BLD AUTO: 0.28 X10*3/UL (ref 0–0.4)
EOSINOPHIL NFR BLD AUTO: 3.8 %
ERYTHROCYTE [DISTWIDTH] IN BLOOD BY AUTOMATED COUNT: 13 % (ref 11.5–14.5)
EST. AVERAGE GLUCOSE BLD GHB EST-MCNC: 166 MG/DL
GLUCOSE SERPL-MCNC: 171 MG/DL (ref 74–99)
HBA1C MFR BLD: 7.4 %
HCT VFR BLD AUTO: 42.9 % (ref 41–52)
HGB BLD-MCNC: 14.4 G/DL (ref 13.5–17.5)
IMM GRANULOCYTES # BLD AUTO: 0.03 X10*3/UL (ref 0–0.5)
IMM GRANULOCYTES NFR BLD AUTO: 0.4 % (ref 0–0.9)
LYMPHOCYTES # BLD AUTO: 1.86 X10*3/UL (ref 0.8–3)
LYMPHOCYTES NFR BLD AUTO: 24.9 %
MCH RBC QN AUTO: 31.2 PG (ref 26–34)
MCHC RBC AUTO-ENTMCNC: 33.6 G/DL (ref 32–36)
MCV RBC AUTO: 93 FL (ref 80–100)
MONOCYTES # BLD AUTO: 0.66 X10*3/UL (ref 0.05–0.8)
MONOCYTES NFR BLD AUTO: 8.8 %
NEUTROPHILS # BLD AUTO: 4.58 X10*3/UL (ref 1.6–5.5)
NEUTROPHILS NFR BLD AUTO: 61.4 %
NRBC BLD-RTO: 0 /100 WBCS (ref 0–0)
PLATELET # BLD AUTO: 330 X10*3/UL (ref 150–450)
POTASSIUM SERPL-SCNC: 4.6 MMOL/L (ref 3.5–5.3)
PROT SERPL-MCNC: 6.6 G/DL (ref 6.4–8.2)
RBC # BLD AUTO: 4.61 X10*6/UL (ref 4.5–5.9)
SODIUM SERPL-SCNC: 137 MMOL/L (ref 136–145)
WBC # BLD AUTO: 7.5 X10*3/UL (ref 4.4–11.3)

## 2024-07-18 PROCEDURE — 80053 COMPREHEN METABOLIC PANEL: CPT

## 2024-07-18 PROCEDURE — 85025 COMPLETE CBC W/AUTO DIFF WBC: CPT

## 2024-07-18 PROCEDURE — 36415 COLL VENOUS BLD VENIPUNCTURE: CPT

## 2024-07-18 PROCEDURE — 83036 HEMOGLOBIN GLYCOSYLATED A1C: CPT

## 2024-07-24 ENCOUNTER — APPOINTMENT (OUTPATIENT)
Dept: PRIMARY CARE | Facility: CLINIC | Age: 84
End: 2024-07-24
Payer: MEDICARE

## 2024-07-24 VITALS
BODY MASS INDEX: 33.8 KG/M2 | DIASTOLIC BLOOD PRESSURE: 55 MMHG | RESPIRATION RATE: 16 BRPM | SYSTOLIC BLOOD PRESSURE: 147 MMHG | WEIGHT: 223 LBS | HEART RATE: 86 BPM | HEIGHT: 68 IN

## 2024-07-24 DIAGNOSIS — I10 HYPERTENSION, UNSPECIFIED TYPE: ICD-10-CM

## 2024-07-24 DIAGNOSIS — F03.90 DEMENTIA WITHOUT BEHAVIORAL DISTURBANCE, PSYCHOTIC DISTURBANCE, MOOD DISTURBANCE, OR ANXIETY, UNSPECIFIED DEMENTIA SEVERITY, UNSPECIFIED DEMENTIA TYPE (MULTI): ICD-10-CM

## 2024-07-24 DIAGNOSIS — E11.9 TYPE 2 DIABETES MELLITUS WITHOUT COMPLICATION, WITHOUT LONG-TERM CURRENT USE OF INSULIN (MULTI): ICD-10-CM

## 2024-07-24 DIAGNOSIS — I10 ESSENTIAL HYPERTENSION: ICD-10-CM

## 2024-07-24 DIAGNOSIS — E11.69 TYPE 2 DIABETES MELLITUS WITH OTHER SPECIFIED COMPLICATION, UNSPECIFIED WHETHER LONG TERM INSULIN USE (MULTI): ICD-10-CM

## 2024-07-24 DIAGNOSIS — E11.59 TYPE 2 DIABETES MELLITUS WITH OTHER CIRCULATORY COMPLICATION, WITH LONG-TERM CURRENT USE OF INSULIN (MULTI): ICD-10-CM

## 2024-07-24 DIAGNOSIS — K59.00 CONSTIPATION, UNSPECIFIED CONSTIPATION TYPE: ICD-10-CM

## 2024-07-24 DIAGNOSIS — F02.80 AD (ALZHEIMER'S DISEASE) (MULTI): ICD-10-CM

## 2024-07-24 DIAGNOSIS — R80.9 PROTEINURIA, UNSPECIFIED TYPE: ICD-10-CM

## 2024-07-24 DIAGNOSIS — Z79.4 TYPE 2 DIABETES MELLITUS WITH OTHER CIRCULATORY COMPLICATION, WITH LONG-TERM CURRENT USE OF INSULIN (MULTI): ICD-10-CM

## 2024-07-24 DIAGNOSIS — E11.69 TYPE 2 DIABETES MELLITUS WITH OTHER SPECIFIED COMPLICATION, WITHOUT LONG-TERM CURRENT USE OF INSULIN (MULTI): ICD-10-CM

## 2024-07-24 DIAGNOSIS — E78.5 HYPERLIPIDEMIA, UNSPECIFIED HYPERLIPIDEMIA TYPE: ICD-10-CM

## 2024-07-24 DIAGNOSIS — G30.9 AD (ALZHEIMER'S DISEASE) (MULTI): ICD-10-CM

## 2024-07-24 DIAGNOSIS — Z00.00 ENCOUNTER FOR ANNUAL WELLNESS VISIT (AWV) IN MEDICARE PATIENT: Primary | ICD-10-CM

## 2024-07-24 PROCEDURE — 1170F FXNL STATUS ASSESSED: CPT | Performed by: FAMILY MEDICINE

## 2024-07-24 PROCEDURE — 3078F DIAST BP <80 MM HG: CPT | Performed by: FAMILY MEDICINE

## 2024-07-24 PROCEDURE — 1158F ADVNC CARE PLAN TLK DOCD: CPT | Performed by: FAMILY MEDICINE

## 2024-07-24 PROCEDURE — 3077F SYST BP >= 140 MM HG: CPT | Performed by: FAMILY MEDICINE

## 2024-07-24 PROCEDURE — 1157F ADVNC CARE PLAN IN RCRD: CPT | Performed by: FAMILY MEDICINE

## 2024-07-24 PROCEDURE — 87086 URINE CULTURE/COLONY COUNT: CPT

## 2024-07-24 PROCEDURE — 81001 URINALYSIS AUTO W/SCOPE: CPT

## 2024-07-24 PROCEDURE — 82043 UR ALBUMIN QUANTITATIVE: CPT

## 2024-07-24 PROCEDURE — 1123F ACP DISCUSS/DSCN MKR DOCD: CPT | Performed by: FAMILY MEDICINE

## 2024-07-24 PROCEDURE — 1159F MED LIST DOCD IN RCRD: CPT | Performed by: FAMILY MEDICINE

## 2024-07-24 PROCEDURE — G0439 PPPS, SUBSEQ VISIT: HCPCS | Performed by: FAMILY MEDICINE

## 2024-07-24 PROCEDURE — 82570 ASSAY OF URINE CREATININE: CPT

## 2024-07-24 PROCEDURE — 99397 PER PM REEVAL EST PAT 65+ YR: CPT | Performed by: FAMILY MEDICINE

## 2024-07-24 RX ORDER — GLIPIZIDE 10 MG/1
10 TABLET, FILM COATED, EXTENDED RELEASE ORAL DAILY
Qty: 90 TABLET | Refills: 3 | Status: SHIPPED | OUTPATIENT
Start: 2024-07-24

## 2024-07-24 RX ORDER — RAMIPRIL 10 MG/1
10 CAPSULE ORAL
Qty: 90 CAPSULE | Refills: 3 | Status: SHIPPED | OUTPATIENT
Start: 2024-07-24 | End: 2025-07-19

## 2024-07-24 RX ORDER — METFORMIN HYDROCHLORIDE 1000 MG/1
1000 TABLET ORAL
Qty: 180 TABLET | Refills: 3 | Status: SHIPPED | OUTPATIENT
Start: 2024-07-24

## 2024-07-24 RX ORDER — ATORVASTATIN CALCIUM 40 MG/1
40 TABLET, FILM COATED ORAL DAILY
Qty: 90 TABLET | Refills: 3 | Status: SHIPPED | OUTPATIENT
Start: 2024-07-24

## 2024-07-24 ASSESSMENT — PATIENT HEALTH QUESTIONNAIRE - PHQ9
2. FEELING DOWN, DEPRESSED OR HOPELESS: NOT AT ALL
SUM OF ALL RESPONSES TO PHQ9 QUESTIONS 1 AND 2: 0
1. LITTLE INTEREST OR PLEASURE IN DOING THINGS: NOT AT ALL

## 2024-07-24 ASSESSMENT — ACTIVITIES OF DAILY LIVING (ADL)
TAKING_MEDICATION: NEEDS ASSISTANCE
GROCERY_SHOPPING: NEEDS ASSISTANCE
BATHING: INDEPENDENT
MANAGING_FINANCES: NEEDS ASSISTANCE
MANAGING_FINANCES: INDEPENDENT
DOING_HOUSEWORK: INDEPENDENT
DRESSING: INDEPENDENT

## 2024-07-24 NOTE — ASSESSMENT & PLAN NOTE
Well-controlled  Orders:    Urinalysis with Reflex Microscopic; Future    Urine Culture; Future    Albumin-Creatinine Ratio, Urine Random; Future

## 2024-07-24 NOTE — PROGRESS NOTES
Subjective   Patient ID: Adonis Escalona is a 83 y.o. male who presents for Medicare Annual Wellness Visit Subsequent.  HPI  Patient is here with his daughter today for Medicare annual wellness visit    Previous Dr. Is Dr. Doug Esteban.   He has hx of CAD , HLD , status post CABG , recent hx of dementia following with neurology , diabetes was on injection in the past, currently treated with oral meds.      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      Currently patient follows up with Dr. Mcguire geriatrics, for dementia,  Dr. Thiago Tejada endocrinology for diabetes         Currently resides with his partner Susi, they Has been together more than 20 years,  He has 5 kids           Dr. Simons neurology   Dr. Das endocrinologist   Dr. Davalos vision   Dentist.         Patient is here with his daughter , Cori Ariza       NOT CONSTIPATED not having issues with hemorrhoids.   HAS THIS ISSUES FOR YEARS.     GRAND DAUGHTERs ARE NURSES.     DOES NOT WANT VACCINE   OR COLONSCOPY due to age           Review of Systems    Past Medical History:   Diagnosis Date    Dementia (Multi)     Diabetes (Multi)     Hypertension        Past Surgical History:   Procedure Laterality Date    CORONARY ARTERY BYPASS GRAFT      LEG SURGERY Right       Social History     Socioeconomic History    Marital status:    Tobacco Use    Smoking status: Former     Types: Cigarettes   Substance and Sexual Activity    Alcohol use: Not Currently    Drug use: Not Currently     Social Determinants of Health     Food Insecurity: No Food Insecurity (4/15/2024)    Hunger Vital Sign     Worried About Running Out of Food in the Last Year: Never true     Ran Out of Food in the Last Year: Never true      Family History   Problem Relation Name Age of Onset    Other (old age) Mother      Other (old age) Father         MEDICATIONS AND ALLERGIES:    ALLERGIES Gluten    MEDICATIONS  "  Current Outpatient Medications on File Prior to Visit   Medication Sig Dispense Refill    aspirin 81 mg EC tablet Take 1 tablet (81 mg) by mouth once daily.      atorvastatin (Lipitor) 40 mg tablet TAKE 1 TABLET BY MOUTH ONCE  DAILY 90 tablet 3    FreeStyle Maged 2 [place] misc Use as instructed 1 each 0    FreeStyle Maged 2 Sensor kit For continuous glucose monitoring.  Clement every 14 days 2 each 11    furosemide (Lasix) 20 mg tablet Use furosemide as needed for leg swelling or weight gain more than 5 pounds , use it for 3 days for each episode of leg swelling. 30 tablet 2    glipiZIDE XL (Glucotrol XL) 10 mg 24 hr tablet TAKE 1 TABLET BY MOUTH ONCE  DAILY 90 tablet 3    hydrocortisone (Anusol-HC) 2.5 % rectal cream Insert into the rectum 2 times a day. Use for 1 week 28 g 1    memantine (Namenda) 10 mg tablet Take 1 tablet (10 mg) by mouth 2 times a day. 180 tablet 1    metFORMIN (Glucophage) 1,000 mg tablet TAKE 1 TABLET BY MOUTH TWICE  DAILY WITH MEALS 180 tablet 3    polyethylene glycol (Glycolax, Miralax) 17 gram packet Take 17 g by mouth once daily. Mix 1 cap (17g) into 8 ounces of fluid. 100 each 1    ramipril (Altace) 10 mg capsule Take 1 capsule (10 mg) by mouth once daily. 90 capsule 1     No current facility-administered medications on file prior to visit.              Objective   Visit Vitals  /55   Pulse 86   Resp 16   Ht 1.727 m (5' 8\")   Wt 101 kg (223 lb)   BMI 33.91 kg/m²   Smoking Status Former   BSA 2.2 m²          10/24/2023     1:22 PM 1/24/2024    10:52 AM 3/6/2024     3:21 PM 4/15/2024    11:05 AM 4/30/2024    10:09 AM 4/30/2024    10:45 AM 7/24/2024    11:38 AM   Vitals   Systolic 118 126 114 156 148 117 147   Diastolic 70 77 60 87 80 78 55   Heart Rate 76 82 84 79 75  86   Temp 36.5 °C (97.7 °F) 36.4 °C (97.5 °F)   36.5 °C (97.7 °F)     Resp 16 16  18 16  16   Height (in) 1.708 m (5' 7.25\")  1.734 m (5' 8.25\")    1.727 m (5' 8\")   Weight (lb) 213 213 215.6  219  223   BMI 33.11 kg/m2 " 33.11 kg/m2 32.54 kg/m2  33.06 kg/m2  33.91 kg/m2   BSA (m2) 2.14 m2 2.14 m2 2.17 m2  2.19 m2  2.2 m2   Visit Report Report Report Report Report Report Report Report     Physical Exam  Constitutional:       Appearance: Normal appearance.   HENT:      Head: Normocephalic and atraumatic.   Eyes:      Pupils: Pupils are equal, round, and reactive to light.   Cardiovascular:      Rate and Rhythm: Normal rate.   Pulmonary:      Effort: Pulmonary effort is normal.   Musculoskeletal:         General: No swelling.      Cervical back: Normal range of motion.   Skin:     Coloration: Skin is not jaundiced.   Neurological:      General: No focal deficit present.      Mental Status: He is alert and oriented to person, place, and time.             Assessment & Plan  Encounter for annual wellness visit (AWV) in Medicare patient  Risk Factors Identified During Visit: 83-year-old patient with dementia, diabetes  Influenza:    Pneumovax 23: Did not take vaccines  Prevnar: Does not desire vaccines  Shingles Vaccine:   Prostate cancer screening: Will check the PSA level  Colorectal Cancer Screening: Politely refused  Abdominal Aortic Aneurysm screening: Politely refused  Code status : Living will in chart scanned in on January 1, 2024  Orders:    Urinalysis with Reflex Microscopic; Future    Urine Culture; Future    Albumin-Creatinine Ratio, Urine Random; Future    Hypertension, unspecified type  Usually well-controlled, advised to continue monitoring, continue same treatment  Orders:    atorvastatin (Lipitor) 40 mg tablet; Take 1 tablet (40 mg) by mouth once daily.    ramipril (Altace) 10 mg capsule; Take 1 capsule (10 mg) by mouth once daily.    Urinalysis with Reflex Microscopic; Future    Urine Culture; Future    Albumin-Creatinine Ratio, Urine Random; Future    Type 2 diabetes mellitus with other specified complication, unspecified whether long term insulin use (Multi)  Reasonable for age, following with endocrinology  Orders:     Urinalysis with Reflex Microscopic; Future    Urine Culture; Future    Albumin-Creatinine Ratio, Urine Random; Future    Constipation, unspecified constipation type    Orders:    Urinalysis with Reflex Microscopic; Future    Urine Culture; Future    Albumin-Creatinine Ratio, Urine Random; Future    Essential hypertension  Well-controlled  Orders:    Urinalysis with Reflex Microscopic; Future    Urine Culture; Future    Albumin-Creatinine Ratio, Urine Random; Future    AD (Alzheimer's disease) (Multi)  Following with geriatrics, no acute changes  Orders:    Urinalysis with Reflex Microscopic; Future    Urine Culture; Future    Albumin-Creatinine Ratio, Urine Random; Future    Hyperlipidemia, unspecified hyperlipidemia type    Orders:    Urinalysis with Reflex Microscopic; Future    Urine Culture; Future    Albumin-Creatinine Ratio, Urine Random; Future    Type 2 diabetes mellitus with other specified complication, without long-term current use of insulin (Multi)    Orders:    Urinalysis with Reflex Microscopic; Future    Urine Culture; Future    Albumin-Creatinine Ratio, Urine Random; Future    Dementia without behavioral disturbance, psychotic disturbance, mood disturbance, or anxiety, unspecified dementia severity, unspecified dementia type (Multi)    Orders:    atorvastatin (Lipitor) 40 mg tablet; Take 1 tablet (40 mg) by mouth once daily.    Urinalysis with Reflex Microscopic; Future    Urine Culture; Future    Albumin-Creatinine Ratio, Urine Random; Future    Type 2 diabetes mellitus without complication, without long-term current use of insulin (Multi)    Orders:    atorvastatin (Lipitor) 40 mg tablet; Take 1 tablet (40 mg) by mouth once daily.    Urinalysis with Reflex Microscopic; Future    Urine Culture; Future    Albumin-Creatinine Ratio, Urine Random; Future    Proteinuria, unspecified type  Will repeat testing  Orders:    Urinalysis with Reflex Microscopic; Future    Urine Culture; Future     Albumin-Creatinine Ratio, Urine Random; Future

## 2024-07-24 NOTE — ASSESSMENT & PLAN NOTE
Orders:    Urinalysis with Reflex Microscopic; Future    Urine Culture; Future    Albumin-Creatinine Ratio, Urine Random; Future

## 2024-07-24 NOTE — TELEPHONE ENCOUNTER
Patient's daughter called to request that new rx for metformin and glipizide be sent to [facility] pharmacy.

## 2024-07-24 NOTE — ASSESSMENT & PLAN NOTE
Orders:    atorvastatin (Lipitor) 40 mg tablet; Take 1 tablet (40 mg) by mouth once daily.    Urinalysis with Reflex Microscopic; Future    Urine Culture; Future    Albumin-Creatinine Ratio, Urine Random; Future

## 2024-07-24 NOTE — ASSESSMENT & PLAN NOTE
Orders:    Urinalysis with Reflex Microscopic; Future    Urine Culture; Future    Albumin-Creatinine Ratio, Urine Random; Future     Quality 111:Pneumonia Vaccination Status For Older Adults: Pneumococcal vaccine (PPSV23) administered on or after patient’s 60th birthday and before the end of the measurement period Detail Level: Detailed Quality 110: Preventive Care And Screening: Influenza Immunization: Influenza Immunization Administered during Influenza season

## 2024-07-25 LAB
APPEARANCE UR: CLEAR
BILIRUB UR STRIP.AUTO-MCNC: NEGATIVE MG/DL
COLOR UR: ABNORMAL
CREAT UR-MCNC: 67.8 MG/DL (ref 20–370)
GLUCOSE UR STRIP.AUTO-MCNC: NORMAL MG/DL
KETONES UR STRIP.AUTO-MCNC: NEGATIVE MG/DL
LEUKOCYTE ESTERASE UR QL STRIP.AUTO: ABNORMAL
MICROALBUMIN UR-MCNC: 7.1 MG/L
MICROALBUMIN/CREAT UR: 10.5 UG/MG CREAT
NITRITE UR QL STRIP.AUTO: NEGATIVE
PH UR STRIP.AUTO: 5.5 [PH]
PROT UR STRIP.AUTO-MCNC: NEGATIVE MG/DL
RBC # UR STRIP.AUTO: NEGATIVE /UL
RBC #/AREA URNS AUTO: NORMAL /HPF
SP GR UR STRIP.AUTO: 1.01
UROBILINOGEN UR STRIP.AUTO-MCNC: NORMAL MG/DL
WBC #/AREA URNS AUTO: NORMAL /HPF

## 2024-07-26 LAB — BACTERIA UR CULT: NORMAL

## 2024-09-04 NOTE — PATIENT INSTRUCTIONS
Thank you for choosing [facility]  for your health care needs.  If you have any questions, concerns or medical needs, please feel free to contact our office at [phone].    Please ensure you complete your blood work one week before the next scheduled appointment.    To continue Metfomrin 1000mg twice daily   To continue Glipizide 10mg once daily   For a diabetic dilated eye examination  For follow up in 6 months

## 2024-09-04 NOTE — PROGRESS NOTES
"Subjective   Adonis Escalona is a 83 y.o. male who presents for follow up for Type 2 diabetes mellitus. He is accompanied by his daughter.    He has had no major changes to his health since his last appointment.    Known complications due to diabetes included CAD s/p CABG    Cardiovascular risk factors include advanced age (older than 55 for men, 65 for women), diabetes mellitus, hypertension, male gender, and obesity (BMI >= 30 kg/m2). The patient is on an ACE inhibitor or angiotensin II receptor blocker.  The patient has not been previously hospitalized due to diabetic ketoacidosis.     Current symptoms/problems include none. His clinical course has been stable.     Current diabetes regimen is as follows:   Glipizide 10mg once daily   Metformin 1000mg twice daily     The patient is not currently checking the blood glucose.    Patient is using: glucometer    Hypoglycemia frequency: No objective evidence   Hypoglycemia awareness: N/A     Exercise: never     MEALS:  Breakfast - omits   Lunch -   Dinner - meat, pork chop, mashed potatoes  Snacks - rice krispie treats, protien bars, protein ships   Beverages - water, juice, coffee       Review of Systems   Respiratory:  Negative for shortness of breath.    Cardiovascular:  Positive for leg swelling. Negative for chest pain.   Neurological:         Memory loss   All other systems reviewed and are negative.      Objective   /82 (BP Location: Left arm, Patient Position: Sitting, BP Cuff Size: Adult)   Pulse 95   Ht 1.727 m (5' 8\")   Wt 101 kg (223 lb)   BMI 33.91 kg/m²   Physical Exam  Vitals and nursing note reviewed.   Constitutional:       General: He is not in acute distress.     Appearance: Normal appearance. He is normal weight.   HENT:      Head: Normocephalic and atraumatic.      Nose: Nose normal.      Mouth/Throat:      Mouth: Mucous membranes are moist.   Eyes:      Extraocular Movements: Extraocular movements intact.   Cardiovascular:      Rate and " Rhythm: Normal rate and regular rhythm.   Pulmonary:      Effort: Pulmonary effort is normal.      Breath sounds: Normal breath sounds.   Musculoskeletal:         General: Normal range of motion.      Right foot: Normal range of motion. No deformity.      Left foot: Normal range of motion. No deformity.   Feet:      Right foot:      Skin integrity: Skin integrity normal. No ulcer or blister.      Toenail Condition: Right toenails are long.      Left foot:      Skin integrity: Skin integrity normal. No ulcer or blister.      Toenail Condition: Left toenails are long.      Comments: Yellowed toenails   Skin:     General: Skin is warm.   Neurological:      General: No focal deficit present.      Mental Status: He is alert.   Psychiatric:         Mood and Affect: Mood normal.         Lab Review  Glucose (mg/dL)   Date Value   07/18/2024 171 (H)   01/24/2024 130 (H)   09/12/2023 124 (H)   12/08/2022 309 (H)   05/31/2022 175 (H)     Hemoglobin A1C (%)   Date Value   07/18/2024 7.4 (H)   01/24/2024 7.3 (H)   09/12/2023 7.5 (A)   02/21/2023 8.8 (A)   12/08/2022 12.0 (A)     Bicarbonate (mmol/L)   Date Value   07/18/2024 29   01/24/2024 28   09/12/2023 28   12/08/2022 27   05/31/2022 29     Urea Nitrogen (mg/dL)   Date Value   07/18/2024 11   01/24/2024 13   09/12/2023 10   12/08/2022 13   05/31/2022 17     Creatinine (mg/dL)   Date Value   07/18/2024 0.85   01/24/2024 0.91   09/12/2023 0.81   12/08/2022 0.88   05/31/2022 0.92     Lab Results   Component Value Date    CHOL 154 01/24/2024    CHOL 114 09/12/2023    CHOL 144 02/21/2023     Lab Results   Component Value Date    HDL 32.8 01/24/2024    HDL 28.5 (A) 09/12/2023    HDL 32.3 (A) 02/21/2023     Lab Results   Component Value Date    LDLCALC 96 01/24/2024     Lab Results   Component Value Date    TRIG 128 01/24/2024    TRIG 76 09/12/2023    TRIG 123 02/21/2023     Health Maintenance:   Foot Exam:  March 6, 2024  Eye Exam: Lasix five years ago   Urine Albumin:  January  24, 2024    Assessment/Plan   83 year old male presents for follow up for type II DM.  His blood pressure is elevated above goal.    Type 2 diabetes mellitus, without long-term current use of insulin (Multi)  To continue Metfomrin 1000mg twice daily   To continue Glipizide 10mg once daily   For a diabetic dilated eye examination  For follow up in 6 months

## 2024-09-05 ENCOUNTER — APPOINTMENT (OUTPATIENT)
Dept: ENDOCRINOLOGY | Facility: CLINIC | Age: 84
End: 2024-09-05
Payer: MEDICARE

## 2024-09-05 VITALS
HEIGHT: 68 IN | WEIGHT: 223 LBS | HEART RATE: 95 BPM | DIASTOLIC BLOOD PRESSURE: 82 MMHG | BODY MASS INDEX: 33.8 KG/M2 | SYSTOLIC BLOOD PRESSURE: 144 MMHG

## 2024-09-05 DIAGNOSIS — E11.9 TYPE 2 DIABETES MELLITUS WITHOUT COMPLICATION, WITHOUT LONG-TERM CURRENT USE OF INSULIN (MULTI): ICD-10-CM

## 2024-09-05 LAB — POC FINGERSTICK BLOOD GLUCOSE: 125 MG/DL (ref 70–100)

## 2024-09-05 PROCEDURE — 99213 OFFICE O/P EST LOW 20 MIN: CPT | Performed by: INTERNAL MEDICINE

## 2024-09-05 PROCEDURE — 1160F RVW MEDS BY RX/DR IN RCRD: CPT | Performed by: INTERNAL MEDICINE

## 2024-09-05 PROCEDURE — G2211 COMPLEX E/M VISIT ADD ON: HCPCS | Performed by: INTERNAL MEDICINE

## 2024-09-05 PROCEDURE — 1159F MED LIST DOCD IN RCRD: CPT | Performed by: INTERNAL MEDICINE

## 2024-09-05 PROCEDURE — 1157F ADVNC CARE PLAN IN RCRD: CPT | Performed by: INTERNAL MEDICINE

## 2024-09-05 PROCEDURE — 82962 GLUCOSE BLOOD TEST: CPT | Performed by: INTERNAL MEDICINE

## 2024-09-05 PROCEDURE — 3077F SYST BP >= 140 MM HG: CPT | Performed by: INTERNAL MEDICINE

## 2024-09-05 PROCEDURE — 3079F DIAST BP 80-89 MM HG: CPT | Performed by: INTERNAL MEDICINE

## 2024-09-05 ASSESSMENT — ENCOUNTER SYMPTOMS: SHORTNESS OF BREATH: 0

## 2024-09-08 DIAGNOSIS — F03.90 DEMENTIA WITHOUT BEHAVIORAL DISTURBANCE, PSYCHOTIC DISTURBANCE, MOOD DISTURBANCE, OR ANXIETY, UNSPECIFIED DEMENTIA SEVERITY, UNSPECIFIED DEMENTIA TYPE (MULTI): ICD-10-CM

## 2024-09-08 NOTE — ASSESSMENT & PLAN NOTE
To continue Metfomrin 1000mg twice daily   To continue Glipizide 10mg once daily   For a diabetic dilated eye examination  For follow up in 6 months

## 2024-09-09 RX ORDER — MEMANTINE HYDROCHLORIDE 10 MG/1
10 TABLET ORAL 2 TIMES DAILY
Qty: 180 TABLET | Refills: 3 | Status: SHIPPED | OUTPATIENT
Start: 2024-09-09
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
## 2024-10-21 ENCOUNTER — OFFICE VISIT (OUTPATIENT)
Dept: GERIATRIC MEDICINE | Facility: HOSPITAL | Age: 84
End: 2024-10-21
Payer: MEDICARE

## 2024-10-21 ENCOUNTER — APPOINTMENT (OUTPATIENT)
Dept: GERIATRIC MEDICINE | Facility: HOSPITAL | Age: 84
End: 2024-10-21
Payer: MEDICARE

## 2024-10-21 VITALS
HEART RATE: 80 BPM | SYSTOLIC BLOOD PRESSURE: 152 MMHG | DIASTOLIC BLOOD PRESSURE: 76 MMHG | OXYGEN SATURATION: 98 % | RESPIRATION RATE: 20 BRPM

## 2024-10-21 DIAGNOSIS — R05.8 PRODUCTIVE COUGH: ICD-10-CM

## 2024-10-21 DIAGNOSIS — I25.10 CORONARY ARTERY DISEASE INVOLVING NATIVE CORONARY ARTERY OF NATIVE HEART WITHOUT ANGINA PECTORIS: ICD-10-CM

## 2024-10-21 DIAGNOSIS — F03.90 DEMENTIA WITHOUT BEHAVIORAL DISTURBANCE, PSYCHOTIC DISTURBANCE, MOOD DISTURBANCE, OR ANXIETY, UNSPECIFIED DEMENTIA SEVERITY, UNSPECIFIED DEMENTIA TYPE: Primary | ICD-10-CM

## 2024-10-21 DIAGNOSIS — R41.844 EXECUTIVE FUNCTION DEFICIT: ICD-10-CM

## 2024-10-21 DIAGNOSIS — E11.69 TYPE 2 DIABETES MELLITUS WITH OTHER SPECIFIED COMPLICATION, WITHOUT LONG-TERM CURRENT USE OF INSULIN: ICD-10-CM

## 2024-10-21 DIAGNOSIS — M79.89 LEG SWELLING: ICD-10-CM

## 2024-10-21 DIAGNOSIS — I10 ESSENTIAL HYPERTENSION: ICD-10-CM

## 2024-10-21 PROCEDURE — 3078F DIAST BP <80 MM HG: CPT | Performed by: INTERNAL MEDICINE

## 2024-10-21 PROCEDURE — 3077F SYST BP >= 140 MM HG: CPT | Performed by: INTERNAL MEDICINE

## 2024-10-21 PROCEDURE — 99215 OFFICE O/P EST HI 40 MIN: CPT | Performed by: INTERNAL MEDICINE

## 2024-10-21 PROCEDURE — 1157F ADVNC CARE PLAN IN RCRD: CPT | Performed by: INTERNAL MEDICINE

## 2024-10-21 PROCEDURE — 1159F MED LIST DOCD IN RCRD: CPT | Performed by: INTERNAL MEDICINE

## 2024-10-21 RX ORDER — ASPIRIN 81 MG/1
81 TABLET ORAL
Qty: 90 TABLET | Refills: 3 | Status: SHIPPED | OUTPATIENT
Start: 2024-10-21

## 2024-10-21 SDOH — ECONOMIC STABILITY: FOOD INSECURITY: WITHIN THE PAST 12 MONTHS, YOU WORRIED THAT YOUR FOOD WOULD RUN OUT BEFORE YOU GOT MONEY TO BUY MORE.: NEVER TRUE

## 2024-10-21 SDOH — ECONOMIC STABILITY: FOOD INSECURITY: WITHIN THE PAST 12 MONTHS, THE FOOD YOU BOUGHT JUST DIDN'T LAST AND YOU DIDN'T HAVE MONEY TO GET MORE.: NEVER TRUE

## 2024-10-21 ASSESSMENT — PATIENT HEALTH QUESTIONNAIRE - PHQ9
2. FEELING DOWN, DEPRESSED OR HOPELESS: NOT AT ALL
1. LITTLE INTEREST OR PLEASURE IN DOING THINGS: NOT AT ALL
SUM OF ALL RESPONSES TO PHQ9 QUESTIONS 1 AND 2: 0

## 2024-10-21 ASSESSMENT — ENCOUNTER SYMPTOMS
LOSS OF SENSATION IN FEET: 0
DEPRESSION: 0
OCCASIONAL FEELINGS OF UNSTEADINESS: 0

## 2024-10-21 ASSESSMENT — COLUMBIA-SUICIDE SEVERITY RATING SCALE - C-SSRS
6. HAVE YOU EVER DONE ANYTHING, STARTED TO DO ANYTHING, OR PREPARED TO DO ANYTHING TO END YOUR LIFE?: NO
2. HAVE YOU ACTUALLY HAD ANY THOUGHTS OF KILLING YOURSELF?: NO
1. IN THE PAST MONTH, HAVE YOU WISHED YOU WERE DEAD OR WISHED YOU COULD GO TO SLEEP AND NOT WAKE UP?: NO

## 2024-10-21 NOTE — PATIENT INSTRUCTIONS
Have blood work done today  - it was ordered by the endocrinologist    2. Wear compression stockings during the day, take  off at night    3. Consider taking the lasix (furosemide)   - 1 tab, on Mondays, Wednesday, and Fridays    4. Keep legs elevated when sitting     5. For your memory  A. Increase socialization:  - consider going to [employer] during the day; interact with family  B. Increase physical exercise:  - try walking 30 minutes 5 times per week  C. Increase mental stimulation:  - brain stimulating activities- crossword, puzzles, sudoku, word searches, read books, craft, learn a new hobby, take a class  D. Mediterranean diet- fruits, vegetables, lean meats, and fish, omega 3 fatty fish    6. Follow up 4 months with Dr. Mccormick at [facility]  - or with Dr. Mcguire virtually

## 2024-11-18 ENCOUNTER — PATIENT MESSAGE (OUTPATIENT)
Dept: PRIMARY CARE | Facility: CLINIC | Age: 84
End: 2024-11-18
Payer: MEDICARE

## 2024-11-18 DIAGNOSIS — H04.123 DRY EYES: Primary | ICD-10-CM

## 2024-11-25 ENCOUNTER — HOSPITAL ENCOUNTER (EMERGENCY)
Facility: HOSPITAL | Age: 84
Discharge: HOME | End: 2024-11-25
Attending: STUDENT IN AN ORGANIZED HEALTH CARE EDUCATION/TRAINING PROGRAM
Payer: MEDICARE

## 2024-11-25 ENCOUNTER — APPOINTMENT (OUTPATIENT)
Dept: RADIOLOGY | Facility: HOSPITAL | Age: 84
End: 2024-11-25
Payer: MEDICARE

## 2024-11-25 VITALS
BODY MASS INDEX: 35.65 KG/M2 | OXYGEN SATURATION: 95 % | HEIGHT: 68 IN | TEMPERATURE: 98.8 F | RESPIRATION RATE: 18 BRPM | SYSTOLIC BLOOD PRESSURE: 153 MMHG | DIASTOLIC BLOOD PRESSURE: 79 MMHG | HEART RATE: 89 BPM | WEIGHT: 235.23 LBS

## 2024-11-25 DIAGNOSIS — I25.10 CORONARY ARTERY CALCIFICATION: ICD-10-CM

## 2024-11-25 DIAGNOSIS — K29.70 GASTRITIS WITHOUT BLEEDING, UNSPECIFIED CHRONICITY, UNSPECIFIED GASTRITIS TYPE: Primary | ICD-10-CM

## 2024-11-25 LAB
ALBUMIN SERPL BCP-MCNC: 4.1 G/DL (ref 3.4–5)
ALP SERPL-CCNC: 63 U/L (ref 33–136)
ALT SERPL W P-5'-P-CCNC: 12 U/L (ref 10–52)
ANION GAP SERPL CALC-SCNC: 13 MMOL/L (ref 10–20)
APPEARANCE UR: CLEAR
AST SERPL W P-5'-P-CCNC: 11 U/L (ref 9–39)
BASOPHILS # BLD AUTO: 0.03 X10*3/UL (ref 0–0.1)
BASOPHILS NFR BLD AUTO: 0.2 %
BILIRUB SERPL-MCNC: 0.8 MG/DL (ref 0–1.2)
BILIRUB UR STRIP.AUTO-MCNC: NEGATIVE MG/DL
BUN SERPL-MCNC: 12 MG/DL (ref 6–23)
CALCIUM SERPL-MCNC: 9.1 MG/DL (ref 8.6–10.3)
CHLORIDE SERPL-SCNC: 98 MMOL/L (ref 98–107)
CO2 SERPL-SCNC: 26 MMOL/L (ref 21–32)
COLOR UR: YELLOW
CREAT SERPL-MCNC: 0.87 MG/DL (ref 0.5–1.3)
EGFRCR SERPLBLD CKD-EPI 2021: 85 ML/MIN/1.73M*2
EOSINOPHIL # BLD AUTO: 0.01 X10*3/UL (ref 0–0.4)
EOSINOPHIL NFR BLD AUTO: 0.1 %
ERYTHROCYTE [DISTWIDTH] IN BLOOD BY AUTOMATED COUNT: 12.8 % (ref 11.5–14.5)
GLUCOSE SERPL-MCNC: 214 MG/DL (ref 74–99)
GLUCOSE UR STRIP.AUTO-MCNC: ABNORMAL MG/DL
HCT VFR BLD AUTO: 43.7 % (ref 41–52)
HGB BLD-MCNC: 14.6 G/DL (ref 13.5–17.5)
HOLD SPECIMEN: NORMAL
IMM GRANULOCYTES # BLD AUTO: 0.08 X10*3/UL (ref 0–0.5)
IMM GRANULOCYTES NFR BLD AUTO: 0.6 % (ref 0–0.9)
INR PPP: 1 (ref 0.9–1.1)
KETONES UR STRIP.AUTO-MCNC: ABNORMAL MG/DL
LACTATE SERPL-SCNC: 2 MMOL/L (ref 0.4–2)
LEUKOCYTE ESTERASE UR QL STRIP.AUTO: NEGATIVE
LIPASE SERPL-CCNC: 12 U/L (ref 9–82)
LYMPHOCYTES # BLD AUTO: 1.24 X10*3/UL (ref 0.8–3)
LYMPHOCYTES NFR BLD AUTO: 8.7 %
MCH RBC QN AUTO: 30.8 PG (ref 26–34)
MCHC RBC AUTO-ENTMCNC: 33.4 G/DL (ref 32–36)
MCV RBC AUTO: 92 FL (ref 80–100)
MONOCYTES # BLD AUTO: 1.33 X10*3/UL (ref 0.05–0.8)
MONOCYTES NFR BLD AUTO: 9.3 %
NEUTROPHILS # BLD AUTO: 11.63 X10*3/UL (ref 1.6–5.5)
NEUTROPHILS NFR BLD AUTO: 81.1 %
NITRITE UR QL STRIP.AUTO: NEGATIVE
NRBC BLD-RTO: 0 /100 WBCS (ref 0–0)
PH UR STRIP.AUTO: 7.5 [PH]
PLATELET # BLD AUTO: 318 X10*3/UL (ref 150–450)
POTASSIUM SERPL-SCNC: 4.2 MMOL/L (ref 3.5–5.3)
PROT SERPL-MCNC: 7.1 G/DL (ref 6.4–8.2)
PROT UR STRIP.AUTO-MCNC: ABNORMAL MG/DL
PROTHROMBIN TIME: 11.6 SECONDS (ref 9.8–12.8)
RBC # BLD AUTO: 4.74 X10*6/UL (ref 4.5–5.9)
RBC # UR STRIP.AUTO: NEGATIVE /UL
RBC #/AREA URNS AUTO: NORMAL /HPF
SODIUM SERPL-SCNC: 133 MMOL/L (ref 136–145)
SP GR UR STRIP.AUTO: <1.005
UROBILINOGEN UR STRIP.AUTO-MCNC: ABNORMAL MG/DL
WBC # BLD AUTO: 14.3 X10*3/UL (ref 4.4–11.3)
WBC #/AREA URNS AUTO: NORMAL /HPF

## 2024-11-25 PROCEDURE — 76705 ECHO EXAM OF ABDOMEN: CPT

## 2024-11-25 PROCEDURE — 76705 ECHO EXAM OF ABDOMEN: CPT | Mod: FOREIGN READ | Performed by: RADIOLOGY

## 2024-11-25 PROCEDURE — 83690 ASSAY OF LIPASE: CPT | Performed by: NURSE PRACTITIONER

## 2024-11-25 PROCEDURE — 96361 HYDRATE IV INFUSION ADD-ON: CPT

## 2024-11-25 PROCEDURE — 2500000004 HC RX 250 GENERAL PHARMACY W/ HCPCS (ALT 636 FOR OP/ED): Performed by: STUDENT IN AN ORGANIZED HEALTH CARE EDUCATION/TRAINING PROGRAM

## 2024-11-25 PROCEDURE — 83605 ASSAY OF LACTIC ACID: CPT | Performed by: NURSE PRACTITIONER

## 2024-11-25 PROCEDURE — 81001 URINALYSIS AUTO W/SCOPE: CPT | Performed by: NURSE PRACTITIONER

## 2024-11-25 PROCEDURE — 2550000001 HC RX 255 CONTRASTS: Performed by: NURSE PRACTITIONER

## 2024-11-25 PROCEDURE — 96374 THER/PROPH/DIAG INJ IV PUSH: CPT | Mod: 59

## 2024-11-25 PROCEDURE — 36415 COLL VENOUS BLD VENIPUNCTURE: CPT | Performed by: NURSE PRACTITIONER

## 2024-11-25 PROCEDURE — 85025 COMPLETE CBC W/AUTO DIFF WBC: CPT | Performed by: NURSE PRACTITIONER

## 2024-11-25 PROCEDURE — 2500000001 HC RX 250 WO HCPCS SELF ADMINISTERED DRUGS (ALT 637 FOR MEDICARE OP): Performed by: STUDENT IN AN ORGANIZED HEALTH CARE EDUCATION/TRAINING PROGRAM

## 2024-11-25 PROCEDURE — 99285 EMERGENCY DEPT VISIT HI MDM: CPT | Mod: 25

## 2024-11-25 PROCEDURE — 2500000004 HC RX 250 GENERAL PHARMACY W/ HCPCS (ALT 636 FOR OP/ED): Performed by: NURSE PRACTITIONER

## 2024-11-25 PROCEDURE — 2500000002 HC RX 250 W HCPCS SELF ADMINISTERED DRUGS (ALT 637 FOR MEDICARE OP, ALT 636 FOR OP/ED): Mod: MUE | Performed by: NURSE PRACTITIONER

## 2024-11-25 PROCEDURE — 85610 PROTHROMBIN TIME: CPT | Performed by: NURSE PRACTITIONER

## 2024-11-25 PROCEDURE — 80053 COMPREHEN METABOLIC PANEL: CPT | Performed by: NURSE PRACTITIONER

## 2024-11-25 PROCEDURE — 74177 CT ABD & PELVIS W/CONTRAST: CPT

## 2024-11-25 PROCEDURE — 74177 CT ABD & PELVIS W/CONTRAST: CPT | Mod: FOREIGN READ | Performed by: RADIOLOGY

## 2024-11-25 PROCEDURE — 2500000005 HC RX 250 GENERAL PHARMACY W/O HCPCS: Performed by: STUDENT IN AN ORGANIZED HEALTH CARE EDUCATION/TRAINING PROGRAM

## 2024-11-25 PROCEDURE — 2500000001 HC RX 250 WO HCPCS SELF ADMINISTERED DRUGS (ALT 637 FOR MEDICARE OP): Performed by: NURSE PRACTITIONER

## 2024-11-25 RX ORDER — SUCRALFATE 1 G/1
1 TABLET ORAL
Qty: 20 TABLET | Refills: 0 | Status: SHIPPED | OUTPATIENT
Start: 2024-11-25 | End: 2024-11-25

## 2024-11-25 RX ORDER — GLIPIZIDE 10 MG/1
10 TABLET, FILM COATED, EXTENDED RELEASE ORAL ONCE
Status: COMPLETED | OUTPATIENT
Start: 2024-11-25 | End: 2024-11-25

## 2024-11-25 RX ORDER — OMEPRAZOLE 40 MG/1
40 CAPSULE, DELAYED RELEASE ORAL
Qty: 15 CAPSULE | Refills: 0 | Status: SHIPPED | OUTPATIENT
Start: 2024-11-25 | End: 2024-11-25

## 2024-11-25 RX ORDER — ATORVASTATIN CALCIUM 40 MG/1
40 TABLET, FILM COATED ORAL NIGHTLY
Status: DISCONTINUED | OUTPATIENT
Start: 2024-11-25 | End: 2024-11-25

## 2024-11-25 RX ORDER — ATORVASTATIN CALCIUM 40 MG/1
40 TABLET, FILM COATED ORAL ONCE
Status: COMPLETED | OUTPATIENT
Start: 2024-11-25 | End: 2024-11-25

## 2024-11-25 RX ORDER — SUCRALFATE 1 G/1
1 TABLET ORAL
Qty: 20 TABLET | Refills: 0 | Status: SHIPPED | OUTPATIENT
Start: 2024-11-25 | End: 2024-11-30

## 2024-11-25 RX ORDER — MEMANTINE HYDROCHLORIDE 5 MG/1
10 TABLET ORAL 2 TIMES DAILY
Status: DISCONTINUED | OUTPATIENT
Start: 2024-11-25 | End: 2024-11-25 | Stop reason: HOSPADM

## 2024-11-25 RX ORDER — MEMANTINE HYDROCHLORIDE 5 MG/1
10 TABLET ORAL DAILY
Status: DISCONTINUED | OUTPATIENT
Start: 2024-11-25 | End: 2024-11-25

## 2024-11-25 RX ORDER — METFORMIN HYDROCHLORIDE 500 MG/1
1000 TABLET ORAL ONCE
Status: COMPLETED | OUTPATIENT
Start: 2024-11-25 | End: 2024-11-25

## 2024-11-25 RX ORDER — LISINOPRIL 10 MG/1
20 TABLET ORAL ONCE
Status: COMPLETED | OUTPATIENT
Start: 2024-11-25 | End: 2024-11-25

## 2024-11-25 RX ORDER — OMEPRAZOLE 40 MG/1
40 CAPSULE, DELAYED RELEASE ORAL
Qty: 15 CAPSULE | Refills: 0 | Status: SHIPPED | OUTPATIENT
Start: 2024-11-25 | End: 2024-12-10

## 2024-11-25 RX ORDER — GLIPIZIDE 10 MG/1
10 TABLET ORAL ONCE
Status: DISCONTINUED | OUTPATIENT
Start: 2024-11-25 | End: 2024-11-25 | Stop reason: WASHOUT

## 2024-11-25 RX ORDER — SUCRALFATE 1 G/1
1 TABLET ORAL ONCE
Status: COMPLETED | OUTPATIENT
Start: 2024-11-25 | End: 2024-11-25

## 2024-11-25 RX ORDER — NAPROXEN SODIUM 220 MG/1
81 TABLET, FILM COATED ORAL ONCE
Status: COMPLETED | OUTPATIENT
Start: 2024-11-25 | End: 2024-11-25

## 2024-11-25 RX ORDER — LIDOCAINE HYDROCHLORIDE 20 MG/ML
15 SOLUTION OROPHARYNGEAL ONCE
Status: COMPLETED | OUTPATIENT
Start: 2024-11-25 | End: 2024-11-25

## 2024-11-25 RX ORDER — FAMOTIDINE 10 MG/ML
20 INJECTION INTRAVENOUS ONCE
Status: COMPLETED | OUTPATIENT
Start: 2024-11-25 | End: 2024-11-25

## 2024-11-25 RX ORDER — ALUMINUM HYDROXIDE, MAGNESIUM HYDROXIDE, AND SIMETHICONE 1200; 120; 1200 MG/30ML; MG/30ML; MG/30ML
30 SUSPENSION ORAL ONCE
Status: COMPLETED | OUTPATIENT
Start: 2024-11-25 | End: 2024-11-25

## 2024-11-25 ASSESSMENT — PAIN DESCRIPTION - PAIN TYPE: TYPE: ACUTE PAIN

## 2024-11-25 ASSESSMENT — COLUMBIA-SUICIDE SEVERITY RATING SCALE - C-SSRS
2. HAVE YOU ACTUALLY HAD ANY THOUGHTS OF KILLING YOURSELF?: NO
1. IN THE PAST MONTH, HAVE YOU WISHED YOU WERE DEAD OR WISHED YOU COULD GO TO SLEEP AND NOT WAKE UP?: NO
6. HAVE YOU EVER DONE ANYTHING, STARTED TO DO ANYTHING, OR PREPARED TO DO ANYTHING TO END YOUR LIFE?: NO

## 2024-11-25 ASSESSMENT — PAIN - FUNCTIONAL ASSESSMENT: PAIN_FUNCTIONAL_ASSESSMENT: 0-10

## 2024-11-25 ASSESSMENT — PAIN DESCRIPTION - LOCATION: LOCATION: ABDOMEN

## 2024-11-25 ASSESSMENT — PAIN SCALES - GENERAL: PAINLEVEL_OUTOF10: 5 - MODERATE PAIN

## 2024-11-25 NOTE — ED PROVIDER NOTES
HPI   Chief Complaint   Patient presents with    Abdominal Pain       This is an 84-year-old  male, with a past medical history of CAD, diabetes, dementia, and hypertension, that is presenting to the emergency room with complaints of abdominal pain and distention.  He woke up yesterday morning and noted that his abdomen was very hard.  Eating and drinking made his pain worse.  It was located throughout the abdomen.  The patient reports that he had 4 hard bowel movements yesterday.  The patient does have a history of hemorrhoids and noted some blood on the stool, which is his baseline.  He also has a history of constipation.  The patient decreased his oral intake secondary to the abdominal pain.  Denies any fevers, chills, nausea, vomiting.  Denies any alteration in his urine function.  The patient is gluten-free and reports that he ate a new item that he had obtained from the store.  The patient is not having any chest pain, shortness of breath, dizziness, palpitations, paresthesias, or focal weakness.      History provided by:  Patient and relative   used: No            Patient History   Past Medical History:   Diagnosis Date    Dementia     Diabetes (Multi)     Hypertension      Past Surgical History:   Procedure Laterality Date    CORONARY ARTERY BYPASS GRAFT      LEG SURGERY Right      Family History   Problem Relation Name Age of Onset    Other (old age) Mother      Other (old age) Father       Social History     Tobacco Use    Smoking status: Former     Types: Cigarettes    Smokeless tobacco: Not on file   Vaping Use    Vaping status: Not on file   Substance Use Topics    Alcohol use: Not Currently    Drug use: Not Currently       Physical Exam   ED Triage Vitals [11/25/24 0956]   Temperature Heart Rate Respirations BP   37.1 °C (98.8 °F) 99 16 (!) 159/93      Pulse Ox Temp Source Heart Rate Source Patient Position   98 % Temporal -- --      BP Location FiO2 (%)     -- --        Physical Exam  Vitals and nursing note reviewed.   HENT:      Head: Normocephalic.      Mouth/Throat:      Pharynx: Oropharynx is clear.   Eyes:      Extraocular Movements: Extraocular movements intact.      Pupils: Pupils are equal, round, and reactive to light.   Cardiovascular:      Rate and Rhythm: Normal rate and regular rhythm.      Heart sounds: Normal heart sounds.   Pulmonary:      Effort: Pulmonary effort is normal.      Breath sounds: Normal breath sounds.   Abdominal:      General: Bowel sounds are normal. There is distension.      Palpations: Abdomen is soft.      Tenderness: There is generalized abdominal tenderness.   Skin:     General: Skin is warm.      Capillary Refill: Capillary refill takes less than 2 seconds.   Neurological:      Mental Status: He is alert. Mental status is at baseline.      Cranial Nerves: Cranial nerves 2-12 are intact.      Sensory: Sensation is intact.      Motor: Motor function is intact.      Coordination: Coordination is intact.      Gait: Gait is intact.      Deep Tendon Reflexes: Reflexes are normal and symmetric.   Psychiatric:         Mood and Affect: Mood normal.           ED Course & MDM   ED Course as of 11/25/24 1908 Mon Nov 25, 2024 1907 Twelve-lead EKG interpreted by me.  Sinus rhythm at a rate of 93.  NH interval is 189, QRS is 103, QT is 348, QTc is 433.  Normal axis.  Normal interval.  No acute ischemia or injury pattern noted. [CT]      ED Course User Index  [CT] MARIANNE Terry-CNP         Diagnoses as of 11/25/24 1908   Gastritis without bleeding, unspecified chronicity, unspecified gastritis type   Coronary artery calcification                 No data recorded     Jose Coma Scale Score: 15 (11/25/24 1003 : Leona Villar RN)                           Medical Decision Making  The patient was seen and evaluated with the attending physician, Dr. Tong.  The patient is presenting to the emergency room with complaints of abdominal pain  and distention.  On examination, the patient's abdomen was mildly distended but his abdomen was soft.  He had bowel sounds in all quadrants.  Differential diagnosis includes gastritis, gastroenteritis, diverticulitis, gas, constipation, bowel obstruction, cholecystitis, or other acute process.  Saline lock was established.  Laboratory studies were drawn with results as noted.  The patient requested to have his morning meds.  He left out of the house without taking.  The patient was administered metformin, aspirin, Namenda, glipizide, lisinopril, and atorvastatin.  The patient's laboratory studies reveal that he had a mildly elevated white count of 14.3.  Lactic acid was within normal limits.  Glucose was 214 and sodium 133.  The patient had a normal lipase.  The patient was administered a 500 mL liter normal saline bolus for hydration.  A CT of the abdomen pelvis showed prominent gallbladder with questionable sludge and minimal pericholecystic haziness, mild gastric wall thickening, hepatic steatosis, mildly enlarged prostate, and moderate coronary artery calcifications.  There is also patient had decreased enhancement of the medial aspect of the mid lower pole of the right kidney.  Correlation with urinalysis was requested.  The patient was advised to provide sample which did not show any signs of infection.  We did obtain an ultrasound of the gallbladder to rule out cholecystitis.  The ultrasound showed no cholelithiasis, gallbladder, wall thickening or biliary dilatation.  The patient was administered a GI cocktail, Pepcid, and Carafate.  He was given a p.o. challenge he was able to tolerate Jell-O.  At this time, we suspect that the patient is most likely suffering from gastritis.  The patient was provided a prescription for omeprazole and Carafate for home administration.  He was referred to the gastroenterology for further evaluation and treatment.  The patient is to follow-up with his primary care physician  or cardiology regarding the coronary artery calcifications.  Patient was discharged in stable condition with strict return precautions.  Patient left the ER in stable condition.        Procedure  Procedures     Milady Piper, MARIANNE-JASMINA  11/25/24 1714       MARIANNE Terry-JASMINA  11/25/24 1909

## 2024-11-25 NOTE — ED TRIAGE NOTES
Pt presenting to ED for abd pain and distension that started yesterday. Pt is c/o pain that is generalized and has minor tenderness during palpation. Pt reports having approximately 4 normal bowel movements yesterday and no other symptoms. Pt A&Ox3, hx of dementia

## 2024-11-27 ENCOUNTER — OFFICE VISIT (OUTPATIENT)
Dept: PRIMARY CARE | Facility: CLINIC | Age: 84
End: 2024-11-27
Payer: MEDICARE

## 2024-11-27 VITALS
SYSTOLIC BLOOD PRESSURE: 126 MMHG | DIASTOLIC BLOOD PRESSURE: 81 MMHG | HEART RATE: 94 BPM | WEIGHT: 223 LBS | BODY MASS INDEX: 33.91 KG/M2 | RESPIRATION RATE: 16 BRPM | TEMPERATURE: 97.3 F

## 2024-11-27 DIAGNOSIS — K59.00 CONSTIPATION, UNSPECIFIED CONSTIPATION TYPE: ICD-10-CM

## 2024-11-27 DIAGNOSIS — E11.69 TYPE 2 DIABETES MELLITUS WITH OTHER SPECIFIED COMPLICATION, WITHOUT LONG-TERM CURRENT USE OF INSULIN: ICD-10-CM

## 2024-11-27 DIAGNOSIS — R10.84 GENERALIZED ABDOMINAL PAIN: ICD-10-CM

## 2024-11-27 DIAGNOSIS — R14.0 BLOATING: ICD-10-CM

## 2024-11-27 DIAGNOSIS — R14.0 ABDOMINAL DISTENSION: ICD-10-CM

## 2024-11-27 DIAGNOSIS — D72.829 LEUKOCYTOSIS, UNSPECIFIED TYPE: Primary | ICD-10-CM

## 2024-11-27 PROCEDURE — 1157F ADVNC CARE PLAN IN RCRD: CPT | Performed by: FAMILY MEDICINE

## 2024-11-27 PROCEDURE — G2211 COMPLEX E/M VISIT ADD ON: HCPCS | Performed by: FAMILY MEDICINE

## 2024-11-27 PROCEDURE — 3074F SYST BP LT 130 MM HG: CPT | Performed by: FAMILY MEDICINE

## 2024-11-27 PROCEDURE — 1159F MED LIST DOCD IN RCRD: CPT | Performed by: FAMILY MEDICINE

## 2024-11-27 PROCEDURE — 99214 OFFICE O/P EST MOD 30 MIN: CPT | Performed by: FAMILY MEDICINE

## 2024-11-27 PROCEDURE — 3079F DIAST BP 80-89 MM HG: CPT | Performed by: FAMILY MEDICINE

## 2024-11-27 NOTE — PROGRESS NOTES
Subjective   Patient ID: Adonis Escalona is a 84 y.o. male who presents for Hospital Follow-up (Stomach bloating, pain ) and Gastritis.  HPI  Patient was int he ED for the above complaints   Workup suggestive of gastritis   He is constipation   Reporting that moving his bowels helps with the symptosm   No nasuea, no vomiting   He is tolerating diet.   Discharged from the hospital on omeprazole and Carafate  A CT of the abdomen pelvis showed prominent gallbladder with questionable sludge and minimal pericholecystic haziness, mild gastric wall thickening, hepatic steatosis, mildly enlarged prostate, and moderate coronary artery calcifications. There is also patient had decreased enhancement of the medial aspect of the mid lower pole of the right kidney. Correlation with urinalysis was requested. The patient was advised to provide sample which did not show any signs of infection. We did obtain an ultrasound of the gallbladder to rule out cholecystitis. The ultrasound showed no cholelithiasis, gallbladder, wall thickening or biliary dilatation.   Review of Systems    Past Medical History:   Diagnosis Date    Dementia     Diabetes (Multi)     Hypertension        Past Surgical History:   Procedure Laterality Date    CORONARY ARTERY BYPASS GRAFT      LEG SURGERY Right       Social History     Socioeconomic History    Marital status:    Tobacco Use    Smoking status: Former     Types: Cigarettes   Substance and Sexual Activity    Alcohol use: Not Currently    Drug use: Not Currently     Social Drivers of Health     Food Insecurity: No Food Insecurity (10/21/2024)    Hunger Vital Sign     Worried About Running Out of Food in the Last Year: Never true     Ran Out of Food in the Last Year: Never true      Family History   Problem Relation Name Age of Onset    Other (old age) Mother      Other (old age) Father         MEDICATIONS AND ALLERGIES:    ALLERGIES Gluten    MEDICATIONS   Current Outpatient Medications on File  Prior to Visit   Medication Sig Dispense Refill    aspirin 81 mg EC tablet Take 1 tablet (81 mg) by mouth once daily. 90 tablet 3    atorvastatin (Lipitor) 40 mg tablet Take 1 tablet (40 mg) by mouth once daily. 90 tablet 3    FreeStyle Maged 2 Arthur misc Use as instructed 1 each 0    FreeStyle Maged 2 Sensor kit For continuous glucose monitoring.  Clement every 14 days 2 each 11    furosemide (Lasix) 20 mg tablet Use furosemide as needed for leg swelling or weight gain more than 5 pounds , use it for 3 days for each episode of leg swelling. 30 tablet 2    glipiZIDE XL (Glucotrol XL) 10 mg 24 hr tablet Take 1 tablet (10 mg) by mouth once daily. 90 tablet 3    memantine (Namenda) 10 mg tablet TAKE 1 TABLET BY MOUTH TWICE  DAILY 180 tablet 3    metFORMIN (Glucophage) 1,000 mg tablet Take 1 tablet (1,000 mg) by mouth 2 times daily (morning and late afternoon). 180 tablet 3    omeprazole (PriLOSEC) 40 mg DR capsule Take 1 capsule (40 mg) by mouth once daily in the morning. Take before meals for 15 days. Do not crush or chew. 15 capsule 0    ramipril (Altace) 10 mg capsule Take 1 capsule (10 mg) by mouth once daily. 90 capsule 3    sucralfate (Carafate) 1 gram tablet Take 1 tablet (1 g) by mouth 4 times a day before meals for 5 days. 20 tablet 0    hydrocortisone (Anusol-HC) 2.5 % rectal cream Insert into the rectum 2 times a day. Use for 1 week 28 g 1    lubricating eye drops ophthalmic solution Administer 1 drop into both eyes 4 times a day. 70 each 2    [DISCONTINUED] omeprazole (PriLOSEC) 40 mg DR capsule Take 1 capsule (40 mg) by mouth once daily in the morning. Take before meals for 15 days. Do not crush or chew. 15 capsule 0    [DISCONTINUED] sucralfate (Carafate) 1 gram tablet Take 1 tablet (1 g) by mouth 4 times a day before meals for 5 days. 20 tablet 0     No current facility-administered medications on file prior to visit.              Objective   Visit Vitals  /81 (BP Location: Left arm, Patient  "Position: Sitting)   Pulse 94   Temp 36.3 °C (97.3 °F) (Temporal)   Resp 16   Wt 101 kg (223 lb)   BMI 33.91 kg/m²   Smoking Status Former   BSA 2.2 m²          7/24/2024    11:38 AM 9/5/2024    11:10 AM 10/21/2024    10:18 AM 11/25/2024     9:56 AM 11/25/2024     1:03 PM 11/25/2024     3:19 PM 11/27/2024     2:13 PM   Vitals   Systolic 147 144 152 159 178 153 126   Diastolic 55 82 76 93 100 79 81   BP Location  Left arm Left arm    Left arm   Heart Rate 86 95 80 99 90 89 94   Temp    37.1 °C (98.8 °F)   36.3 °C (97.3 °F)   Resp 16  20 16 18 18 16   Height 1.727 m (5' 8\") 1.727 m (5' 8\")  1.727 m (5' 8\")      Weight (lb) 223 223  235.23   223   BMI 33.91 kg/m2 33.91 kg/m2  35.77 kg/m2   33.91 kg/m2   BSA (m2) 2.2 m2 2.2 m2  2.27 m2   2.2 m2   Visit Report Report Report Report    Report     Physical Exam        Assessment & Plan  Leukocytosis, unspecified type  Will recheck levels   Order lab sbelow   Orders:    Referral to Gastroenterology; Future    CBC and Auto Differential; Future    Comprehensive metabolic panel; Future    C-reactive protein; Future    Sedimentation Rate; Future    Hemoglobin A1C; Future    Urinalysis with Reflex Microscopic; Future    Urine Culture; Future    Abdominal distension  Advised to start miralax daily to prevent constipation   Because it is contributing to his symptoms   Will check labs below   Refer to GI   Continue omeprazole and carafate PRN  Orders:    Referral to Gastroenterology; Future    CBC and Auto Differential; Future    Comprehensive metabolic panel; Future    C-reactive protein; Future    Sedimentation Rate; Future    Hemoglobin A1C; Future    Urinalysis with Reflex Microscopic; Future    Urine Culture; Future    Generalized abdominal pain    Orders:    Referral to Gastroenterology; Future    CBC and Auto Differential; Future    Comprehensive metabolic panel; Future    C-reactive protein; Future    Sedimentation Rate; Future    Hemoglobin A1C; Future    Urinalysis with " Reflex Microscopic; Future    Urine Culture; Future    Bloating    Orders:    Referral to Gastroenterology; Future    CBC and Auto Differential; Future    Comprehensive metabolic panel; Future    C-reactive protein; Future    Sedimentation Rate; Future    Hemoglobin A1C; Future    Urinalysis with Reflex Microscopic; Future    Urine Culture; Future    Constipation, unspecified constipation type  Will treat with miralax   Orders:    Referral to Gastroenterology; Future    CBC and Auto Differential; Future    Comprehensive metabolic panel; Future    C-reactive protein; Future    Sedimentation Rate; Future    Hemoglobin A1C; Future    Urinalysis with Reflex Microscopic; Future    Urine Culture; Future    Type 2 diabetes mellitus with other specified complication, without long-term current use of insulin  Will check A1C   Patient is following with endocrionlogy   Orders:    CBC and Auto Differential; Future    Comprehensive metabolic panel; Future    C-reactive protein; Future    Sedimentation Rate; Future    Hemoglobin A1C; Future    Urinalysis with Reflex Microscopic; Future    Urine Culture; Future

## 2024-11-27 NOTE — ASSESSMENT & PLAN NOTE
Will check A1C   Patient is following with endocrionlogy   Orders:    CBC and Auto Differential; Future    Comprehensive metabolic panel; Future    C-reactive protein; Future    Sedimentation Rate; Future    Hemoglobin A1C; Future    Urinalysis with Reflex Microscopic; Future    Urine Culture; Future

## 2024-12-02 ENCOUNTER — LAB (OUTPATIENT)
Dept: LAB | Facility: LAB | Age: 84
End: 2024-12-02
Payer: MEDICARE

## 2024-12-02 DIAGNOSIS — K59.00 CONSTIPATION, UNSPECIFIED CONSTIPATION TYPE: ICD-10-CM

## 2024-12-02 DIAGNOSIS — R10.84 GENERALIZED ABDOMINAL PAIN: ICD-10-CM

## 2024-12-02 DIAGNOSIS — E11.69 TYPE 2 DIABETES MELLITUS WITH OTHER SPECIFIED COMPLICATION, WITHOUT LONG-TERM CURRENT USE OF INSULIN: ICD-10-CM

## 2024-12-02 DIAGNOSIS — R14.0 BLOATING: ICD-10-CM

## 2024-12-02 DIAGNOSIS — R14.0 ABDOMINAL DISTENSION: ICD-10-CM

## 2024-12-02 DIAGNOSIS — D72.829 LEUKOCYTOSIS, UNSPECIFIED TYPE: ICD-10-CM

## 2024-12-02 LAB
ALBUMIN SERPL BCP-MCNC: 3.8 G/DL (ref 3.4–5)
ALP SERPL-CCNC: 57 U/L (ref 33–136)
ALT SERPL W P-5'-P-CCNC: 13 U/L (ref 10–52)
ANION GAP SERPL CALC-SCNC: 13 MMOL/L (ref 10–20)
AST SERPL W P-5'-P-CCNC: 12 U/L (ref 9–39)
BASOPHILS # BLD AUTO: 0.04 X10*3/UL (ref 0–0.1)
BASOPHILS NFR BLD AUTO: 0.6 %
BILIRUB SERPL-MCNC: 0.6 MG/DL (ref 0–1.2)
BUN SERPL-MCNC: 10 MG/DL (ref 6–23)
CALCIUM SERPL-MCNC: 9.3 MG/DL (ref 8.6–10.3)
CHLORIDE SERPL-SCNC: 102 MMOL/L (ref 98–107)
CO2 SERPL-SCNC: 29 MMOL/L (ref 21–32)
CREAT SERPL-MCNC: 1.02 MG/DL (ref 0.5–1.3)
CRP SERPL-MCNC: 0.35 MG/DL
EGFRCR SERPLBLD CKD-EPI 2021: 72 ML/MIN/1.73M*2
EOSINOPHIL # BLD AUTO: 0.17 X10*3/UL (ref 0–0.4)
EOSINOPHIL NFR BLD AUTO: 2.4 %
ERYTHROCYTE [DISTWIDTH] IN BLOOD BY AUTOMATED COUNT: 12.8 % (ref 11.5–14.5)
ERYTHROCYTE [SEDIMENTATION RATE] IN BLOOD BY WESTERGREN METHOD: 6 MM/H (ref 0–20)
GLUCOSE SERPL-MCNC: 150 MG/DL (ref 74–99)
HCT VFR BLD AUTO: 40.2 % (ref 41–52)
HGB BLD-MCNC: 13.3 G/DL (ref 13.5–17.5)
IMM GRANULOCYTES # BLD AUTO: 0.04 X10*3/UL (ref 0–0.5)
IMM GRANULOCYTES NFR BLD AUTO: 0.6 % (ref 0–0.9)
LYMPHOCYTES # BLD AUTO: 2.03 X10*3/UL (ref 0.8–3)
LYMPHOCYTES NFR BLD AUTO: 29.2 %
MCH RBC QN AUTO: 30.9 PG (ref 26–34)
MCHC RBC AUTO-ENTMCNC: 33.1 G/DL (ref 32–36)
MCV RBC AUTO: 94 FL (ref 80–100)
MONOCYTES # BLD AUTO: 0.67 X10*3/UL (ref 0.05–0.8)
MONOCYTES NFR BLD AUTO: 9.6 %
NEUTROPHILS # BLD AUTO: 4.01 X10*3/UL (ref 1.6–5.5)
NEUTROPHILS NFR BLD AUTO: 57.6 %
NRBC BLD-RTO: 0 /100 WBCS (ref 0–0)
PLATELET # BLD AUTO: 398 X10*3/UL (ref 150–450)
POTASSIUM SERPL-SCNC: 4.7 MMOL/L (ref 3.5–5.3)
PROT SERPL-MCNC: 6 G/DL (ref 6.4–8.2)
RBC # BLD AUTO: 4.3 X10*6/UL (ref 4.5–5.9)
SODIUM SERPL-SCNC: 139 MMOL/L (ref 136–145)
WBC # BLD AUTO: 7 X10*3/UL (ref 4.4–11.3)

## 2024-12-02 PROCEDURE — 80053 COMPREHEN METABOLIC PANEL: CPT

## 2024-12-02 PROCEDURE — 85652 RBC SED RATE AUTOMATED: CPT

## 2024-12-02 PROCEDURE — 85025 COMPLETE CBC W/AUTO DIFF WBC: CPT

## 2024-12-02 PROCEDURE — 83036 HEMOGLOBIN GLYCOSYLATED A1C: CPT

## 2024-12-02 PROCEDURE — 86140 C-REACTIVE PROTEIN: CPT

## 2024-12-02 PROCEDURE — 36415 COLL VENOUS BLD VENIPUNCTURE: CPT

## 2024-12-02 PROCEDURE — 87086 URINE CULTURE/COLONY COUNT: CPT

## 2024-12-03 LAB
EST. AVERAGE GLUCOSE BLD GHB EST-MCNC: 180 MG/DL
HBA1C MFR BLD: 7.9 %

## 2024-12-04 LAB — BACTERIA UR CULT: NORMAL

## 2024-12-06 ENCOUNTER — PATIENT MESSAGE (OUTPATIENT)
Dept: PRIMARY CARE | Facility: CLINIC | Age: 84
End: 2024-12-06
Payer: MEDICARE

## 2024-12-06 ENCOUNTER — TELEPHONE (OUTPATIENT)
Dept: PRIMARY CARE | Facility: CLINIC | Age: 84
End: 2024-12-06
Payer: MEDICARE

## 2024-12-06 DIAGNOSIS — D72.829 LEUKOCYTOSIS, UNSPECIFIED TYPE: Primary | ICD-10-CM

## 2024-12-06 DIAGNOSIS — R35.0 FREQUENT URINATION: ICD-10-CM

## 2024-12-06 DIAGNOSIS — I10 ESSENTIAL HYPERTENSION: ICD-10-CM

## 2024-12-06 DIAGNOSIS — E11.69 TYPE 2 DIABETES MELLITUS WITH OTHER SPECIFIED COMPLICATION, UNSPECIFIED WHETHER LONG TERM INSULIN USE (MULTI): ICD-10-CM

## 2024-12-06 DIAGNOSIS — I10 HYPERTENSION, UNSPECIFIED TYPE: ICD-10-CM

## 2024-12-06 NOTE — TELEPHONE ENCOUNTER
----- Message from Champ Suarez sent at 12/5/2024  3:43 PM EST -----  A1C 7.9 , continue watching diet   Kidney , liver , electrolytes normal   Blood counts dropped slightly to 13.3 , lets keep an eye on the levels , recheck in 1 month to make sure it is stable.   White blood cells normal this time   Urien with possible contamination , repeat culture if symptosm present .   Inflamatory markers normal     How is he feeling , is his abdominal pain better?

## 2024-12-18 DIAGNOSIS — L60.8 ACQUIRED DEFORMITY OF TOENAIL: ICD-10-CM

## 2024-12-18 DIAGNOSIS — M25.559 HIP PAIN, UNSPECIFIED LATERALITY: Primary | ICD-10-CM

## 2024-12-18 DIAGNOSIS — G30.9 AD (ALZHEIMER'S DISEASE) (MULTI): ICD-10-CM

## 2024-12-18 DIAGNOSIS — F02.80 AD (ALZHEIMER'S DISEASE) (MULTI): ICD-10-CM

## 2025-01-09 ENCOUNTER — LAB (OUTPATIENT)
Dept: LAB | Facility: LAB | Age: 85
End: 2025-01-09
Payer: MEDICARE

## 2025-01-09 DIAGNOSIS — E11.69 TYPE 2 DIABETES MELLITUS WITH OTHER SPECIFIED COMPLICATION, UNSPECIFIED WHETHER LONG TERM INSULIN USE (MULTI): ICD-10-CM

## 2025-01-09 DIAGNOSIS — I10 HYPERTENSION, UNSPECIFIED TYPE: ICD-10-CM

## 2025-01-09 DIAGNOSIS — I10 ESSENTIAL HYPERTENSION: ICD-10-CM

## 2025-01-09 DIAGNOSIS — D72.829 LEUKOCYTOSIS, UNSPECIFIED TYPE: ICD-10-CM

## 2025-01-09 LAB
ALBUMIN SERPL BCP-MCNC: 4.1 G/DL (ref 3.4–5)
ALP SERPL-CCNC: 62 U/L (ref 33–136)
ALT SERPL W P-5'-P-CCNC: 15 U/L (ref 10–52)
ANION GAP SERPL CALC-SCNC: 13 MMOL/L (ref 10–20)
APPEARANCE UR: CLEAR
AST SERPL W P-5'-P-CCNC: 13 U/L (ref 9–39)
BASOPHILS # BLD AUTO: 0.05 X10*3/UL (ref 0–0.1)
BASOPHILS NFR BLD AUTO: 0.6 %
BILIRUB SERPL-MCNC: 0.5 MG/DL (ref 0–1.2)
BILIRUB UR STRIP.AUTO-MCNC: NEGATIVE MG/DL
BUN SERPL-MCNC: 11 MG/DL (ref 6–23)
CALCIUM SERPL-MCNC: 9.2 MG/DL (ref 8.6–10.3)
CHLORIDE SERPL-SCNC: 100 MMOL/L (ref 98–107)
CO2 SERPL-SCNC: 27 MMOL/L (ref 21–32)
COLOR UR: COLORLESS
CREAT SERPL-MCNC: 0.94 MG/DL (ref 0.5–1.3)
EGFRCR SERPLBLD CKD-EPI 2021: 79 ML/MIN/1.73M*2
EOSINOPHIL # BLD AUTO: 0.18 X10*3/UL (ref 0–0.4)
EOSINOPHIL NFR BLD AUTO: 2.3 %
ERYTHROCYTE [DISTWIDTH] IN BLOOD BY AUTOMATED COUNT: 12.8 % (ref 11.5–14.5)
FERRITIN SERPL-MCNC: 64 NG/ML (ref 20–300)
GLUCOSE SERPL-MCNC: 114 MG/DL (ref 74–99)
GLUCOSE UR STRIP.AUTO-MCNC: NORMAL MG/DL
HCT VFR BLD AUTO: 41.9 % (ref 41–52)
HGB BLD-MCNC: 13.3 G/DL (ref 13.5–17.5)
IMM GRANULOCYTES # BLD AUTO: 0.04 X10*3/UL (ref 0–0.5)
IMM GRANULOCYTES NFR BLD AUTO: 0.5 % (ref 0–0.9)
IRON SATN MFR SERPL: 19 % (ref 25–45)
IRON SERPL-MCNC: 64 UG/DL (ref 35–150)
KETONES UR STRIP.AUTO-MCNC: NEGATIVE MG/DL
LEUKOCYTE ESTERASE UR QL STRIP.AUTO: NEGATIVE
LYMPHOCYTES # BLD AUTO: 2.7 X10*3/UL (ref 0.8–3)
LYMPHOCYTES NFR BLD AUTO: 34 %
MCH RBC QN AUTO: 30.4 PG (ref 26–34)
MCHC RBC AUTO-ENTMCNC: 31.7 G/DL (ref 32–36)
MCV RBC AUTO: 96 FL (ref 80–100)
MONOCYTES # BLD AUTO: 0.77 X10*3/UL (ref 0.05–0.8)
MONOCYTES NFR BLD AUTO: 9.7 %
NEUTROPHILS # BLD AUTO: 4.19 X10*3/UL (ref 1.6–5.5)
NEUTROPHILS NFR BLD AUTO: 52.9 %
NITRITE UR QL STRIP.AUTO: NEGATIVE
NRBC BLD-RTO: 0 /100 WBCS (ref 0–0)
PH UR STRIP.AUTO: 7 [PH]
PLATELET # BLD AUTO: 357 X10*3/UL (ref 150–450)
POTASSIUM SERPL-SCNC: 4.7 MMOL/L (ref 3.5–5.3)
PROT SERPL-MCNC: 6.4 G/DL (ref 6.4–8.2)
PROT UR STRIP.AUTO-MCNC: NEGATIVE MG/DL
RBC # BLD AUTO: 4.37 X10*6/UL (ref 4.5–5.9)
RBC # UR STRIP.AUTO: NEGATIVE /UL
SODIUM SERPL-SCNC: 135 MMOL/L (ref 136–145)
SP GR UR STRIP.AUTO: 1
TIBC SERPL-MCNC: 343 UG/DL (ref 240–445)
UIBC SERPL-MCNC: 279 UG/DL (ref 110–370)
UROBILINOGEN UR STRIP.AUTO-MCNC: NORMAL MG/DL
WBC # BLD AUTO: 7.9 X10*3/UL (ref 4.4–11.3)

## 2025-01-09 PROCEDURE — 82728 ASSAY OF FERRITIN: CPT

## 2025-01-09 PROCEDURE — 83540 ASSAY OF IRON: CPT

## 2025-01-09 PROCEDURE — 80053 COMPREHEN METABOLIC PANEL: CPT

## 2025-01-09 PROCEDURE — 83550 IRON BINDING TEST: CPT

## 2025-01-09 PROCEDURE — 83036 HEMOGLOBIN GLYCOSYLATED A1C: CPT

## 2025-01-09 PROCEDURE — 85025 COMPLETE CBC W/AUTO DIFF WBC: CPT

## 2025-01-09 PROCEDURE — 81003 URINALYSIS AUTO W/O SCOPE: CPT

## 2025-01-10 LAB
EST. AVERAGE GLUCOSE BLD GHB EST-MCNC: 183 MG/DL
HBA1C MFR BLD: 8 %

## 2025-01-24 ENCOUNTER — APPOINTMENT (OUTPATIENT)
Dept: PRIMARY CARE | Facility: CLINIC | Age: 85
End: 2025-01-24
Payer: MEDICARE

## 2025-01-30 ENCOUNTER — APPOINTMENT (OUTPATIENT)
Dept: PODIATRY | Facility: CLINIC | Age: 85
End: 2025-01-30
Payer: MEDICARE

## 2025-01-30 DIAGNOSIS — M79.674 TOE PAIN, RIGHT: ICD-10-CM

## 2025-01-30 DIAGNOSIS — F02.80 AD (ALZHEIMER'S DISEASE) (MULTI): ICD-10-CM

## 2025-01-30 DIAGNOSIS — E11.9 DIABETES MELLITUS WITHOUT COMPLICATION (MULTI): ICD-10-CM

## 2025-01-30 DIAGNOSIS — B35.1 TINEA UNGUIUM: Primary | ICD-10-CM

## 2025-01-30 DIAGNOSIS — M79.675 TOE PAIN, LEFT: ICD-10-CM

## 2025-01-30 DIAGNOSIS — L60.8 ACQUIRED DEFORMITY OF TOENAIL: ICD-10-CM

## 2025-01-30 DIAGNOSIS — G30.9 AD (ALZHEIMER'S DISEASE) (MULTI): ICD-10-CM

## 2025-01-30 PROCEDURE — 1159F MED LIST DOCD IN RCRD: CPT | Performed by: PODIATRIST

## 2025-01-30 PROCEDURE — 99202 OFFICE O/P NEW SF 15 MIN: CPT | Performed by: PODIATRIST

## 2025-01-30 PROCEDURE — 1157F ADVNC CARE PLAN IN RCRD: CPT | Performed by: PODIATRIST

## 2025-01-30 PROCEDURE — 11721 DEBRIDE NAIL 6 OR MORE: CPT | Performed by: PODIATRIST

## 2025-01-30 PROCEDURE — 1160F RVW MEDS BY RX/DR IN RCRD: CPT | Performed by: PODIATRIST

## 2025-01-30 NOTE — PROGRESS NOTES
CC:  painful thickened and elongated toenails and diabetic care.     HPI: Pt presents for dm foot exam and painful thickened and elongated toenails that are difficult to manage.  Onset was gradual with worsening course until recently.  Aggravated by shoe gear and ambulation. Seen with daughter.      PCP: Dr. Suarez  Last visit: 11-27-24     PMH  Past Medical History:   Diagnosis Date    Dementia     Diabetes (Multi)     Hypertension      MEDS    Current Outpatient Medications:     aspirin 81 mg EC tablet, Take 1 tablet (81 mg) by mouth once daily., Disp: 90 tablet, Rfl: 3    atorvastatin (Lipitor) 40 mg tablet, Take 1 tablet (40 mg) by mouth once daily., Disp: 90 tablet, Rfl: 3    FreeStyle Maged 2 Roach misc, Use as instructed, Disp: 1 each, Rfl: 0    FreeStyle Maged 2 Sensor kit, For continuous glucose monitoring.  Clement every 14 days, Disp: 2 each, Rfl: 11    furosemide (Lasix) 20 mg tablet, Use furosemide as needed for leg swelling or weight gain more than 5 pounds , use it for 3 days for each episode of leg swelling., Disp: 30 tablet, Rfl: 2    glipiZIDE XL (Glucotrol XL) 10 mg 24 hr tablet, Take 1 tablet (10 mg) by mouth once daily., Disp: 90 tablet, Rfl: 3    hydrocortisone (Anusol-HC) 2.5 % rectal cream, Insert into the rectum 2 times a day. Use for 1 week, Disp: 28 g, Rfl: 1    lubricating eye drops ophthalmic solution, Administer 1 drop into both eyes 4 times a day., Disp: 70 each, Rfl: 2    memantine (Namenda) 10 mg tablet, TAKE 1 TABLET BY MOUTH TWICE  DAILY, Disp: 180 tablet, Rfl: 3    metFORMIN (Glucophage) 1,000 mg tablet, Take 1 tablet (1,000 mg) by mouth 2 times daily (morning and late afternoon)., Disp: 180 tablet, Rfl: 3    omeprazole (PriLOSEC) 40 mg DR capsule, Take 1 capsule (40 mg) by mouth once daily in the morning. Take before meals for 15 days. Do not crush or chew., Disp: 15 capsule, Rfl: 0    ramipril (Altace) 10 mg capsule, Take 1 capsule (10 mg) by mouth once daily., Disp: 90 capsule,  Rfl: 3  Allergies  Allergies   Allergen Reactions    Gluten Hives     blisters     Social History     Socioeconomic History    Marital status:    Tobacco Use    Smoking status: Former     Types: Cigarettes   Substance and Sexual Activity    Alcohol use: Not Currently    Drug use: Not Currently     Social Drivers of Health     Food Insecurity: No Food Insecurity (10/21/2024)    Hunger Vital Sign     Worried About Running Out of Food in the Last Year: Never true     Ran Out of Food in the Last Year: Never true     Family History   Problem Relation Name Age of Onset    Other (old age) Mother      Other (old age) Father       Past Surgical History:   Procedure Laterality Date    CORONARY ARTERY BYPASS GRAFT      LEG SURGERY Right        REVIEW OF SYSTEMS  DERM:   + as noted in HPI.       Physical examination:   On General Observation: Patient is a pleasant, cooperative, well developed 84 y.o. diabetic   adult male. The patient is alert and oriented to time, place and person. Patient has normal affect and mood.  There were no vitals taken for this visit.    Vascular:  DP and PT pulses are 2/4 b/l.  mild edema noted. mild varicosities b/l.  CFT  6 seconds to all digits bilateral.  Skin temperature is warm to warm from proximal to distal bilateral.      Muscular: Strength is 5/5 for all instrinsic and extrinsic muscle groups.     Neuro:  Proprioception present.   Sensation to vibration is  present. Protective sensation intact  at all pedal sites via Roulette Dov 5.07 monofilament bilateral.  Light touch present bilateral.     Derm:    Left toenails: 1-5 Brittleness, crumbling upon debridement, subungual debris, elongation, mycotic appearance, tenderness, and thickness.   Right toenails: 1-5 Brittleness, crumbling upon debridement, subungual debris, elongation, mycotic appearance, tenderness, and thickness.   Hair growth is decreased b/l le  Stable subungual hematoma right hallux    ASSESSMENT:    Tinea Unguium  [B35.1]   E11.9  Pain in right toe(s) [M79.674]   Pain in left toe(s) [M79.675]       PLAN:   Consult  A comprehensive history and physical examination were preformed. DM foot care and DM foot manifestations were reviewed.  The patient was educated on clinical findings, diagnosis and treatment plans. Patient understands all that has been explained and all questions were answered to apparent satisfaction.   -Reviewed options for nail fungus, will start topical formula 3  - Debrided toenails 1-10 in length and height. Will monitor the hematoma.  - Follow up in 9-12 weeks.       Josemanuel Pathak DPM
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
## 2025-02-24 ENCOUNTER — OFFICE VISIT (OUTPATIENT)
Dept: GERIATRIC MEDICINE | Facility: HOSPITAL | Age: 85
End: 2025-02-24
Payer: MEDICARE

## 2025-02-24 VITALS — SYSTOLIC BLOOD PRESSURE: 139 MMHG | RESPIRATION RATE: 18 BRPM | DIASTOLIC BLOOD PRESSURE: 93 MMHG | HEART RATE: 80 BPM

## 2025-02-24 DIAGNOSIS — R05.8 PRODUCTIVE COUGH: ICD-10-CM

## 2025-02-24 DIAGNOSIS — H90.3 SENSORINEURAL HEARING LOSS (SNHL) OF BOTH EARS: Primary | ICD-10-CM

## 2025-02-24 DIAGNOSIS — M79.89 LEG SWELLING: ICD-10-CM

## 2025-02-24 DIAGNOSIS — I25.10 CORONARY ARTERY DISEASE INVOLVING NATIVE CORONARY ARTERY OF NATIVE HEART WITHOUT ANGINA PECTORIS: ICD-10-CM

## 2025-02-24 DIAGNOSIS — E11.69 TYPE 2 DIABETES MELLITUS WITH OTHER SPECIFIED COMPLICATION, WITHOUT LONG-TERM CURRENT USE OF INSULIN: ICD-10-CM

## 2025-02-24 DIAGNOSIS — I10 ESSENTIAL HYPERTENSION: ICD-10-CM

## 2025-02-24 DIAGNOSIS — Z91.89 DRIVING SAFETY ISSUE: ICD-10-CM

## 2025-02-24 DIAGNOSIS — F01.50 MIXED ALZHEIMER AND VASCULAR DEMENTIA: ICD-10-CM

## 2025-02-24 DIAGNOSIS — F03.90 DEMENTIA WITHOUT BEHAVIORAL DISTURBANCE, PSYCHOTIC DISTURBANCE, MOOD DISTURBANCE, OR ANXIETY, UNSPECIFIED DEMENTIA SEVERITY, UNSPECIFIED DEMENTIA TYPE: ICD-10-CM

## 2025-02-24 DIAGNOSIS — R41.844 EXECUTIVE FUNCTION DEFICIT: ICD-10-CM

## 2025-02-24 DIAGNOSIS — F02.80 MIXED ALZHEIMER AND VASCULAR DEMENTIA: ICD-10-CM

## 2025-02-24 DIAGNOSIS — G30.9 MIXED ALZHEIMER AND VASCULAR DEMENTIA: ICD-10-CM

## 2025-02-24 PROCEDURE — 1157F ADVNC CARE PLAN IN RCRD: CPT | Performed by: INTERNAL MEDICINE

## 2025-02-24 PROCEDURE — G2211 COMPLEX E/M VISIT ADD ON: HCPCS | Performed by: INTERNAL MEDICINE

## 2025-02-24 PROCEDURE — 1159F MED LIST DOCD IN RCRD: CPT | Performed by: INTERNAL MEDICINE

## 2025-02-24 PROCEDURE — 3080F DIAST BP >= 90 MM HG: CPT | Performed by: INTERNAL MEDICINE

## 2025-02-24 PROCEDURE — 1170F FXNL STATUS ASSESSED: CPT | Performed by: INTERNAL MEDICINE

## 2025-02-24 PROCEDURE — 3075F SYST BP GE 130 - 139MM HG: CPT | Performed by: INTERNAL MEDICINE

## 2025-02-24 PROCEDURE — 99215 OFFICE O/P EST HI 40 MIN: CPT | Performed by: INTERNAL MEDICINE

## 2025-02-24 PROCEDURE — 1160F RVW MEDS BY RX/DR IN RCRD: CPT | Performed by: INTERNAL MEDICINE

## 2025-02-24 ASSESSMENT — MONTREAL COGNITIVE ASSESSMENT (MOCA)
8. SERIAL SUBTRACTION OF 7S: 0
10. [FLUENCY] NAME WORDS STARTING WITH DESIGNATED LETTER: 0
7. [VIGILENCE] TAP WHEN HEARING DESIGNATED LETTER: 1
WHAT IS THE TOTAL SCORE (OUT OF 30): 14
12. MEMORY INDEX SCORE: 0
13. ORIENTATION SUBSCORE: 2
VISUOSPATIAL/EXECUTIVE SUBSCORE: 2
11. FOR EACH PAIR OF WORDS, WHAT CATEGORY DO THEY BELONG TO (OUT OF 2): 1
5. MEMORY TRIALS: 0
6. READ LIST OF DIGITS [FORWARD/BACKWARD]: 2
WHAT LEVEL OF EDUCATION WAS ATTAINED: 1
9. REPEAT EACH SENTENCE: 2
4. NAME EACH OF THE THREE ANIMALS SHOWN: 3

## 2025-02-24 ASSESSMENT — ACTIVITIES OF DAILY LIVING (ADL)
PATIENT'S MEMORY ADEQUATE TO SAFELY COMPLETE DAILY ACTIVITIES?: YES
GROCERY_SHOPPING: NEEDS ASSISTANCE
JUDGMENT_ADEQUATE_SAFELY_COMPLETE_DAILY_ACTIVITIES: NO
MANAGING_FINANCES: NEEDS ASSISTANCE
NEEDS_ASSISTANCE_WITH_FOOD: INDEPENDENT
TAKING_MEDICATION: NEEDS ASSISTANCE
STILL_DRIVING: NO
WALKS IN HOME: INDEPENDENT
TOILETING: INDEPENDENT
PREPARING_MEALS: TOTAL CARE
HEARING - RIGHT EAR: HEARING AID
FEEDING YOURSELF: INDEPENDENT
BATHING: INDEPENDENT
EATING: INDEPENDENT
USING_TELEPHONE: NEEDS ASSISTANCE
DOING_HOUSEWORK: NEEDS ASSISTANCE
ADEQUATE_TO_COMPLETE_ADL: YES
USING_TRANSPORTATION: TOTAL CARE
DRESSING YOURSELF: INDEPENDENT
GROOMING: INDEPENDENT
HEARING - LEFT EAR: HEARING AID
PILL_BOX_USED: NO

## 2025-02-24 ASSESSMENT — ENCOUNTER SYMPTOMS
LOSS OF SENSATION IN FEET: 0
OCCASIONAL FEELINGS OF UNSTEADINESS: 1
DEPRESSION: 0
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
## 2025-03-03 ENCOUNTER — TELEPHONE (OUTPATIENT)
Dept: GERIATRIC MEDICINE | Facility: HOSPITAL | Age: 85
End: 2025-03-03
Payer: MEDICARE

## 2025-03-03 NOTE — TELEPHONE ENCOUNTER
Per discussion, the complete pages of Statement of Expert Evaluation will be given to the daughter with the attached visit note as well.     Thank you, Carlitos

## 2025-03-03 NOTE — TELEPHONE ENCOUNTER
I am happy to help.  The Statement of  Expert Evaluation was emailed to Adonis's Daughter Cori. The physical copies were scanned into the patient chart and then mailed to Cori's home address.

## 2025-03-04 LAB
ALBUMIN SERPL-MCNC: 4.1 G/DL (ref 3.6–5.1)
ALBUMIN/CREAT UR: 8 MG/G CREAT
ALP SERPL-CCNC: 55 U/L (ref 35–144)
ALT SERPL-CCNC: 9 U/L (ref 9–46)
ANION GAP SERPL CALCULATED.4IONS-SCNC: 16 MMOL/L (CALC) (ref 7–17)
AST SERPL-CCNC: 10 U/L (ref 10–35)
BILIRUB SERPL-MCNC: 0.5 MG/DL (ref 0.2–1.2)
BUN SERPL-MCNC: 13 MG/DL (ref 7–25)
CALCIUM SERPL-MCNC: 9.3 MG/DL (ref 8.6–10.3)
CHLORIDE SERPL-SCNC: 99 MMOL/L (ref 98–110)
CHOLEST SERPL-MCNC: 145 MG/DL
CHOLEST/HDLC SERPL: 4.5 (CALC)
CO2 SERPL-SCNC: 21 MMOL/L (ref 20–32)
CREAT SERPL-MCNC: 1 MG/DL (ref 0.7–1.22)
CREAT UR-MCNC: 74 MG/DL (ref 20–320)
EGFRCR SERPLBLD CKD-EPI 2021: 74 ML/MIN/1.73M2
EST. AVERAGE GLUCOSE BLD GHB EST-MCNC: 189 MG/DL
EST. AVERAGE GLUCOSE BLD GHB EST-SCNC: 10.4 MMOL/L
GLUCOSE SERPL-MCNC: 235 MG/DL (ref 65–139)
HBA1C MFR BLD: 8.2 % OF TOTAL HGB
HDLC SERPL-MCNC: 32 MG/DL
LDLC SERPL CALC-MCNC: 84 MG/DL (CALC)
MICROALBUMIN UR-MCNC: 0.6 MG/DL
NONHDLC SERPL-MCNC: 113 MG/DL (CALC)
POTASSIUM SERPL-SCNC: 4.4 MMOL/L (ref 3.5–5.3)
PROT SERPL-MCNC: 6.3 G/DL (ref 6.1–8.1)
SODIUM SERPL-SCNC: 136 MMOL/L (ref 135–146)
TRIGL SERPL-MCNC: 203 MG/DL

## 2025-03-07 NOTE — PATIENT INSTRUCTIONS
Thank you for choosing [facility]  for your health care needs.  If you have any questions, concerns or medical needs, please feel free to contact our office at [phone].    Please ensure you complete your blood work one week before the next scheduled appointment.    To continue Metfomrin 1000mg twice daily   To continue Glipizide 10mg once daily   For a diabetic dilated eye examination  For follow up in 6 months

## 2025-03-07 NOTE — PROGRESS NOTES
"Kaylee Escalona is a 84 y.o. male who presents for follow up for Type 2 diabetes mellitus.    He has had no major changes to his health since his last appointment.    Known complications due to diabetes included CAD s/p CABG and obesity    Cardiovascular risk factors include advanced age (older than 55 for men, 65 for women), diabetes mellitus, hypertension, male gender, and obesity (BMI >= 30 kg/m2). The patient is on an ACE inhibitor or angiotensin II receptor blocker.  The patient has not been previously hospitalized due to diabetic ketoacidosis.     Current symptoms/problems include none. His clinical course has been stable.     Current diabetes regimen is as follows:   Glipizide 10mg once daily   Metformin 1000mg twice daily     The patient is not currently checking the blood glucose.    Patient is using: glucometer    Hypoglycemia frequency: No objective evidence   Hypoglycemia awareness: N/A     Exercise: never; walks around the stores 2-3 bulmaro per week     MEALS:  Breakfast - omits   Lunch - omits   Dinner 2-7pm- goes out   Snacks - rice krispie treats, protien bars, protein ships   Beverages - water, juice, coffee       Review of Systems   Constitutional:  Positive for appetite change.   HENT:          Dry mouth    Eyes:  Positive for visual disturbance.        Left eye blurry; objects appear large to the left eye compared to the right  S/p cataract surgery    Respiratory:          Snoring    Cardiovascular:  Positive for chest pain (Intermittent for years).   Gastrointestinal:  Negative for diarrhea.   Genitourinary:         Urinary incontinence    Musculoskeletal:  Positive for neck pain.   Psychiatric/Behavioral:  Positive for confusion.         Memory loss   All other systems reviewed and are negative.      Objective   /80   Pulse 67   Ht 1.727 m (5' 8\")   Wt 103 kg (227 lb 6.4 oz)   BMI 34.58 kg/m²   Physical Exam  Vitals and nursing note reviewed.   Constitutional:       " General: He is not in acute distress.     Appearance: Normal appearance. He is obese.   HENT:      Head: Normocephalic and atraumatic.      Nose: Nose normal.      Mouth/Throat:      Mouth: Mucous membranes are moist.   Eyes:      Extraocular Movements: Extraocular movements intact.   Cardiovascular:      Rate and Rhythm: Normal rate and regular rhythm.   Pulmonary:      Effort: Pulmonary effort is normal.      Breath sounds: Normal breath sounds.   Musculoskeletal:         General: Normal range of motion.   Skin:     General: Skin is warm.   Neurological:      General: No focal deficit present.      Mental Status: He is alert.   Psychiatric:         Mood and Affect: Mood normal.         Lab Review  GLUCOSE (mg/dL)   Date Value   03/03/2025 235 (H)     Glucose (mg/dL)   Date Value   01/09/2025 114 (H)   12/02/2024 150 (H)   11/25/2024 214 (H)     HEMOGLOBIN A1c (% of total Hgb)   Date Value   03/03/2025 8.2 (H)     Hemoglobin A1C (%)   Date Value   01/09/2025 8.0 (H)   12/02/2024 7.9 (H)   07/18/2024 7.4 (H)     CARBON DIOXIDE (mmol/L)   Date Value   03/03/2025 21     Bicarbonate (mmol/L)   Date Value   01/09/2025 27   12/02/2024 29   11/25/2024 26     UREA NITROGEN (BUN) (mg/dL)   Date Value   03/03/2025 13     Urea Nitrogen (mg/dL)   Date Value   01/09/2025 11   12/02/2024 10   11/25/2024 12     Creatinine (mg/dL)   Date Value   01/09/2025 0.94   12/02/2024 1.02   11/25/2024 0.87     CREATININE (mg/dL)   Date Value   03/03/2025 1.00     Lab Results   Component Value Date    CHOL 145 03/03/2025    CHOL 154 01/24/2024    CHOL 114 09/12/2023     Lab Results   Component Value Date    HDL 32 (L) 03/03/2025    HDL 32.8 01/24/2024    HDL 28.5 (A) 09/12/2023     Lab Results   Component Value Date    LDLCALC 84 03/03/2025    LDLCALC 96 01/24/2024     Lab Results   Component Value Date    TRIG 203 (H) 03/03/2025    TRIG 128 01/24/2024    TRIG 76 09/12/2023     Health Maintenance:   Foot Exam:  March 6, 2024  Eye Exam: Lasix  six years ago   Urine Albumin:  January 24, 2024    Assessment/Plan   84 year old male presents for follow up for type II DM.  His blood pressure is elevated above goal.  He is noted to be on a statin.    Type 2 diabetes mellitus, without long-term current use of insulin (Multi)  To continue Metfomrin 1000mg twice daily   To continue Glipizide 10mg once daily   Counseled that the goal A1C should be 8% or less  Counseled glycemic control is warranted to prevent microvascular complications  Counseled that the ideal BP is less than 130/80 or lower for diabetics   To continue Atorvastatin 40mg once daily   For follow up in 6 months

## 2025-03-10 ENCOUNTER — APPOINTMENT (OUTPATIENT)
Dept: ENDOCRINOLOGY | Facility: CLINIC | Age: 85
End: 2025-03-10
Payer: MEDICARE

## 2025-03-10 VITALS
DIASTOLIC BLOOD PRESSURE: 80 MMHG | BODY MASS INDEX: 34.46 KG/M2 | WEIGHT: 227.4 LBS | HEIGHT: 68 IN | HEART RATE: 67 BPM | SYSTOLIC BLOOD PRESSURE: 152 MMHG

## 2025-03-10 DIAGNOSIS — E11.9 TYPE 2 DIABETES MELLITUS WITHOUT COMPLICATION, WITHOUT LONG-TERM CURRENT USE OF INSULIN (MULTI): ICD-10-CM

## 2025-03-10 LAB — POC FINGERSTICK BLOOD GLUCOSE: 171 MG/DL (ref 70–100)

## 2025-03-10 PROCEDURE — G2211 COMPLEX E/M VISIT ADD ON: HCPCS | Performed by: INTERNAL MEDICINE

## 2025-03-10 PROCEDURE — 3079F DIAST BP 80-89 MM HG: CPT | Performed by: INTERNAL MEDICINE

## 2025-03-10 PROCEDURE — 82962 GLUCOSE BLOOD TEST: CPT | Performed by: INTERNAL MEDICINE

## 2025-03-10 PROCEDURE — 1159F MED LIST DOCD IN RCRD: CPT | Performed by: INTERNAL MEDICINE

## 2025-03-10 PROCEDURE — 1157F ADVNC CARE PLAN IN RCRD: CPT | Performed by: INTERNAL MEDICINE

## 2025-03-10 PROCEDURE — 99214 OFFICE O/P EST MOD 30 MIN: CPT | Performed by: INTERNAL MEDICINE

## 2025-03-10 PROCEDURE — 3077F SYST BP >= 140 MM HG: CPT | Performed by: INTERNAL MEDICINE

## 2025-03-10 PROCEDURE — 1160F RVW MEDS BY RX/DR IN RCRD: CPT | Performed by: INTERNAL MEDICINE

## 2025-03-10 RX ORDER — METFORMIN HYDROCHLORIDE 1000 MG/1
1000 TABLET ORAL
Qty: 180 TABLET | Refills: 3 | Status: SHIPPED | OUTPATIENT
Start: 2025-03-10

## 2025-03-10 RX ORDER — GLIPIZIDE 10 MG/1
10 TABLET, FILM COATED, EXTENDED RELEASE ORAL DAILY
Qty: 90 TABLET | Refills: 3 | Status: SHIPPED | OUTPATIENT
Start: 2025-03-10

## 2025-03-10 ASSESSMENT — ENCOUNTER SYMPTOMS
DIARRHEA: 0
APPETITE CHANGE: 1
NECK PAIN: 1
CONFUSION: 1

## 2025-03-11 ENCOUNTER — APPOINTMENT (OUTPATIENT)
Dept: RADIOLOGY | Facility: HOSPITAL | Age: 85
End: 2025-03-11
Payer: MEDICARE

## 2025-03-11 ENCOUNTER — APPOINTMENT (OUTPATIENT)
Dept: CARDIOLOGY | Facility: HOSPITAL | Age: 85
End: 2025-03-11
Payer: MEDICARE

## 2025-03-11 ENCOUNTER — HOSPITAL ENCOUNTER (EMERGENCY)
Facility: HOSPITAL | Age: 85
Discharge: OTHER INSTITUTION | End: 2025-03-11
Attending: EMERGENCY MEDICINE
Payer: MEDICARE

## 2025-03-11 VITALS
BODY MASS INDEX: 34.4 KG/M2 | DIASTOLIC BLOOD PRESSURE: 76 MMHG | HEART RATE: 92 BPM | WEIGHT: 227 LBS | SYSTOLIC BLOOD PRESSURE: 164 MMHG | OXYGEN SATURATION: 94 % | RESPIRATION RATE: 16 BRPM | HEIGHT: 68 IN | TEMPERATURE: 97.3 F

## 2025-03-11 DIAGNOSIS — S06.360A TRAUMATIC INTRACEREBRAL HEMORRHAGE WITHOUT LOSS OF CONSCIOUSNESS, UNSPECIFIED LATERALITY, INITIAL ENCOUNTER (MULTI): Primary | ICD-10-CM

## 2025-03-11 LAB
ALBUMIN SERPL BCP-MCNC: 4.1 G/DL (ref 3.4–5)
ALP SERPL-CCNC: 51 U/L (ref 33–136)
ALT SERPL W P-5'-P-CCNC: 10 U/L (ref 10–52)
ANION GAP SERPL CALC-SCNC: 15 MMOL/L (ref 10–20)
APTT PPP: 27 SECONDS (ref 26–36)
AST SERPL W P-5'-P-CCNC: 12 U/L (ref 9–39)
BASOPHILS # BLD AUTO: 0.04 X10*3/UL (ref 0–0.1)
BASOPHILS NFR BLD AUTO: 0.4 %
BILIRUB SERPL-MCNC: 0.5 MG/DL (ref 0–1.2)
BUN SERPL-MCNC: 11 MG/DL (ref 6–23)
CALCIUM SERPL-MCNC: 8.8 MG/DL (ref 8.6–10.3)
CHLORIDE SERPL-SCNC: 101 MMOL/L (ref 98–107)
CO2 SERPL-SCNC: 23 MMOL/L (ref 21–32)
CREAT SERPL-MCNC: 0.95 MG/DL (ref 0.5–1.3)
EGFRCR SERPLBLD CKD-EPI 2021: 78 ML/MIN/1.73M*2
EOSINOPHIL # BLD AUTO: 0.19 X10*3/UL (ref 0–0.4)
EOSINOPHIL NFR BLD AUTO: 2.1 %
ERYTHROCYTE [DISTWIDTH] IN BLOOD BY AUTOMATED COUNT: 12.9 % (ref 11.5–14.5)
GLUCOSE BLD MANUAL STRIP-MCNC: 147 MG/DL (ref 74–99)
GLUCOSE SERPL-MCNC: 140 MG/DL (ref 74–99)
HCT VFR BLD AUTO: 41 % (ref 41–52)
HGB BLD-MCNC: 13.5 G/DL (ref 13.5–17.5)
IMM GRANULOCYTES # BLD AUTO: 0.05 X10*3/UL (ref 0–0.5)
IMM GRANULOCYTES NFR BLD AUTO: 0.5 % (ref 0–0.9)
INR PPP: 1 (ref 0.9–1.1)
LYMPHOCYTES # BLD AUTO: 1.99 X10*3/UL (ref 0.8–3)
LYMPHOCYTES NFR BLD AUTO: 21.5 %
MCH RBC QN AUTO: 30.4 PG (ref 26–34)
MCHC RBC AUTO-ENTMCNC: 32.9 G/DL (ref 32–36)
MCV RBC AUTO: 92 FL (ref 80–100)
MONOCYTES # BLD AUTO: 0.81 X10*3/UL (ref 0.05–0.8)
MONOCYTES NFR BLD AUTO: 8.8 %
NEUTROPHILS # BLD AUTO: 6.17 X10*3/UL (ref 1.6–5.5)
NEUTROPHILS NFR BLD AUTO: 66.7 %
NRBC BLD-RTO: 0 /100 WBCS (ref 0–0)
PLATELET # BLD AUTO: 348 X10*3/UL (ref 150–450)
POTASSIUM SERPL-SCNC: 4.1 MMOL/L (ref 3.5–5.3)
PROT SERPL-MCNC: 6.5 G/DL (ref 6.4–8.2)
PROTHROMBIN TIME: 10.9 SECONDS (ref 9.8–12.4)
RBC # BLD AUTO: 4.44 X10*6/UL (ref 4.5–5.9)
SODIUM SERPL-SCNC: 135 MMOL/L (ref 136–145)
WBC # BLD AUTO: 9.3 X10*3/UL (ref 4.4–11.3)

## 2025-03-11 PROCEDURE — 70496 CT ANGIOGRAPHY HEAD: CPT | Performed by: STUDENT IN AN ORGANIZED HEALTH CARE EDUCATION/TRAINING PROGRAM

## 2025-03-11 PROCEDURE — 84075 ASSAY ALKALINE PHOSPHATASE: CPT | Performed by: EMERGENCY MEDICINE

## 2025-03-11 PROCEDURE — 36415 COLL VENOUS BLD VENIPUNCTURE: CPT | Performed by: EMERGENCY MEDICINE

## 2025-03-11 PROCEDURE — 71045 X-RAY EXAM CHEST 1 VIEW: CPT | Mod: FOREIGN READ | Performed by: RADIOLOGY

## 2025-03-11 PROCEDURE — 71045 X-RAY EXAM CHEST 1 VIEW: CPT

## 2025-03-11 PROCEDURE — 96376 TX/PRO/DX INJ SAME DRUG ADON: CPT | Mod: 59

## 2025-03-11 PROCEDURE — 85610 PROTHROMBIN TIME: CPT | Performed by: EMERGENCY MEDICINE

## 2025-03-11 PROCEDURE — 2500000004 HC RX 250 GENERAL PHARMACY W/ HCPCS (ALT 636 FOR OP/ED)

## 2025-03-11 PROCEDURE — 82947 ASSAY GLUCOSE BLOOD QUANT: CPT

## 2025-03-11 PROCEDURE — 2550000001 HC RX 255 CONTRASTS: Performed by: EMERGENCY MEDICINE

## 2025-03-11 PROCEDURE — 85025 COMPLETE CBC W/AUTO DIFF WBC: CPT | Performed by: EMERGENCY MEDICINE

## 2025-03-11 PROCEDURE — 2500000004 HC RX 250 GENERAL PHARMACY W/ HCPCS (ALT 636 FOR OP/ED): Performed by: EMERGENCY MEDICINE

## 2025-03-11 PROCEDURE — 70498 CT ANGIOGRAPHY NECK: CPT | Performed by: STUDENT IN AN ORGANIZED HEALTH CARE EDUCATION/TRAINING PROGRAM

## 2025-03-11 PROCEDURE — 96375 TX/PRO/DX INJ NEW DRUG ADDON: CPT | Mod: 59

## 2025-03-11 PROCEDURE — 93005 ELECTROCARDIOGRAM TRACING: CPT

## 2025-03-11 PROCEDURE — 70498 CT ANGIOGRAPHY NECK: CPT

## 2025-03-11 PROCEDURE — 99291 CRITICAL CARE FIRST HOUR: CPT | Performed by: EMERGENCY MEDICINE

## 2025-03-11 PROCEDURE — 85730 THROMBOPLASTIN TIME PARTIAL: CPT | Performed by: EMERGENCY MEDICINE

## 2025-03-11 PROCEDURE — 96374 THER/PROPH/DIAG INJ IV PUSH: CPT | Mod: 59

## 2025-03-11 RX ORDER — NICARDIPINE HYDROCHLORIDE 0.2 MG/ML
INJECTION INTRAVENOUS
Status: COMPLETED
Start: 2025-03-11 | End: 2025-03-11

## 2025-03-11 RX ORDER — METOCLOPRAMIDE HYDROCHLORIDE 5 MG/ML
10 INJECTION INTRAMUSCULAR; INTRAVENOUS ONCE
Status: COMPLETED | OUTPATIENT
Start: 2025-03-11 | End: 2025-03-11

## 2025-03-11 RX ORDER — NICARDIPINE HYDROCHLORIDE 0.2 MG/ML
2.5-15 INJECTION INTRAVENOUS CONTINUOUS
Status: DISCONTINUED | OUTPATIENT
Start: 2025-03-11 | End: 2025-03-12 | Stop reason: HOSPADM

## 2025-03-11 RX ORDER — DIPHENHYDRAMINE HYDROCHLORIDE 50 MG/ML
12.5 INJECTION, SOLUTION INTRAMUSCULAR; INTRAVENOUS ONCE
Status: COMPLETED | OUTPATIENT
Start: 2025-03-11 | End: 2025-03-11

## 2025-03-11 RX ADMIN — NICARDIPINE HYDROCHLORIDE 2.5 MG/HR: 0.2 INJECTION INTRAVENOUS at 20:20

## 2025-03-11 RX ADMIN — IOHEXOL 100 ML: 350 INJECTION, SOLUTION INTRAVENOUS at 19:05

## 2025-03-11 RX ADMIN — METOCLOPRAMIDE 10 MG: 5 INJECTION, SOLUTION INTRAMUSCULAR; INTRAVENOUS at 17:03

## 2025-03-11 RX ADMIN — NICARDIPINE HYDROCHLORIDE 5 MG/HR: 0.2 INJECTION INTRAVENOUS at 20:30

## 2025-03-11 RX ADMIN — NICARDIPINE HYDROCHLORIDE 7.5 MG/HR: 0.2 INJECTION INTRAVENOUS at 20:58

## 2025-03-11 RX ADMIN — DIPHENHYDRAMINE HYDROCHLORIDE 12.5 MG: 50 INJECTION, SOLUTION INTRAMUSCULAR; INTRAVENOUS at 17:02

## 2025-03-11 RX ADMIN — NICARDIPINE HYDROCHLORIDE 10 MG/HR: 0.2 INJECTION INTRAVENOUS at 21:09

## 2025-03-11 RX ADMIN — NICARDIPINE HYDROCHLORIDE 12.5 MG/HR: 0.2 INJECTION INTRAVENOUS at 21:17

## 2025-03-11 ASSESSMENT — COLUMBIA-SUICIDE SEVERITY RATING SCALE - C-SSRS
1. IN THE PAST MONTH, HAVE YOU WISHED YOU WERE DEAD OR WISHED YOU COULD GO TO SLEEP AND NOT WAKE UP?: NO
6. HAVE YOU EVER DONE ANYTHING, STARTED TO DO ANYTHING, OR PREPARED TO DO ANYTHING TO END YOUR LIFE?: NO
2. HAVE YOU ACTUALLY HAD ANY THOUGHTS OF KILLING YOURSELF?: NO

## 2025-03-11 ASSESSMENT — PAIN SCALES - GENERAL
PAINLEVEL_OUTOF10: 0 - NO PAIN
PAINLEVEL_OUTOF10: 10 - WORST POSSIBLE PAIN

## 2025-03-11 ASSESSMENT — PAIN - FUNCTIONAL ASSESSMENT: PAIN_FUNCTIONAL_ASSESSMENT: 0-10

## 2025-03-11 NOTE — ED PROVIDER NOTES
HPI   Chief Complaint   Patient presents with    Headache       Chief complaint: Headache    History of present illness: Patient is an 84-year-old male with history of coronary artery disease hypertension hyperlipidemia presenting to the emergency department with complaints of a left-sided headache.  According to the patient, his pain started this morning when he woke up.  The patient states that he is never had a headache like this in the past.  The patient told his family they have concerns as the patient does not usually complain of pain.  The patient takes a baby aspirin daily but denies any other blood thinners she denies any recent trauma.  Concern, the patient was brought to the emergency department for further evaluation.      History provided by:  Patient and relative   used: No            Patient History   Past Medical History:   Diagnosis Date    Dementia     Diabetes (Multi)     Hypertension      Past Surgical History:   Procedure Laterality Date    CORONARY ARTERY BYPASS GRAFT      LEG SURGERY Right      Family History   Problem Relation Name Age of Onset    Other (old age) Mother      Other (old age) Father       Social History     Tobacco Use    Smoking status: Former     Types: Cigarettes    Smokeless tobacco: Not on file   Vaping Use    Vaping status: Not on file   Substance Use Topics    Alcohol use: Not Currently    Drug use: Not Currently       Physical Exam   ED Triage Vitals [03/11/25 1602]   Temperature Heart Rate Respirations BP   36.3 °C (97.3 °F) 86 16 175/74      Pulse Ox Temp src Heart Rate Source Patient Position   95 % -- -- --      BP Location FiO2 (%)     -- --       Physical Exam  Vitals and nursing note reviewed.   Constitutional:       General: He is not in acute distress.     Appearance: He is well-developed.   HENT:      Head: Normocephalic and atraumatic.   Eyes:      Conjunctiva/sclera: Conjunctivae normal.   Cardiovascular:      Rate and Rhythm: Normal rate  and regular rhythm.      Heart sounds: No murmur heard.  Pulmonary:      Effort: Pulmonary effort is normal. No respiratory distress.      Breath sounds: Normal breath sounds.   Abdominal:      Palpations: Abdomen is soft.      Tenderness: There is no abdominal tenderness.   Musculoskeletal:         General: No swelling.      Cervical back: Neck supple.   Skin:     General: Skin is warm and dry.      Capillary Refill: Capillary refill takes less than 2 seconds.   Neurological:      Mental Status: He is alert.   Psychiatric:         Mood and Affect: Mood normal.           ED Course & MDM                  No data recorded     Jose Coma Scale Score: 15 (03/11/25 1606 : Montserrat Villeda RN)                           Medical Decision Making  Medical Decision Making: Patient remained stable during my time with him in the emergency department.  CBC demonstrated no significant acute abnormalities Chem-7 and LFTs were all within normal limits patient's PTT and INR were both within normal limits.    To my surprise, patient's CAT scan of the head demonstrated a intraparenchymal hemorrhage measuring 4.4 x 2.9 x 2.5 cm in the patient's left occiput and associated surrounding subarachnoid hemorrhage.  The patient's chest x-ray demonstrated no significant acute abnormalities meanwhile, the patient's EKG demonstrated a sinus tachycardia with a heart rate of 102 bpm isoelectric ST segments narrow QRS complexes and a QTc of 483.    Patient presents to the emergency department with complaints of a headache.  Workup was performed as above and demonstrated an intracranial hemorrhage.  Given the patient's hypertension during his time in the emergency department with a blood pressure as high as 172, I ordered nicardipine drip with a target systolic blood pressure of 140.    I spoke at length with the patient's family they inform me that they would require a request to go to [facility] ([place] for admission to the hospital as the  patient will require neurosurgery support which is not available here at [facility].  I spoke with the transfer center at [facility] regarding his patient's case first, I spoke with neurosurgery they agreed the patient could be sent to their facility they requested I spoke with both neurology I spoke with neurology regarding this patient's case at [facility] they expressed understanding and agreement of the patient's management thus far.  Finally, I spoke with the intensivist at [facility].  They agreed the patient could be admitted to their service.  The patient will be transported by critical care transport.  Patient's family expressed understanding and appreciation    Amount and/or Complexity of Data Reviewed  Labs: ordered. Decision-making details documented in ED Course.  Radiology: ordered. Decision-making details documented in ED Course.  ECG/medicine tests: ordered and independent interpretation performed. Decision-making details documented in ED Course.        Procedure  Critical Care    Performed by: Damaso Carreon MD  Authorized by: Damaso Carreon MD    Critical care provider statement:     Critical care time (minutes):  75    Critical care time was exclusive of:  Separately billable procedures and treating other patients    Critical care was necessary to treat or prevent imminent or life-threatening deterioration of the following conditions:  CNS failure or compromise    Critical care was time spent personally by me on the following activities:  Blood draw for specimens, discussions with consultants, evaluation of patient's response to treatment, examination of patient, ordering and performing treatments and interventions, ordering and review of laboratory studies, ordering and review of radiographic studies, pulse oximetry and re-evaluation of patient's condition    Care discussed with: admitting provider         Damaso Carreon MD  03/11/25 5440

## 2025-03-12 LAB
ATRIAL RATE: 103 BPM
P AXIS: 73 DEGREES
PR INTERVAL: 200 MS
Q ONSET: 253 MS
QRS COUNT: 17 BEATS
QRS DURATION: 105 MS
QT INTERVAL: 370 MS
QTC CALCULATION(BAZETT): 483 MS
QTC FREDERICIA: 441 MS
R AXIS: 46 DEGREES
T AXIS: 47 DEGREES
T OFFSET: 438 MS
VENTRICULAR RATE: 102 BPM

## 2025-03-12 NOTE — ED NOTES
ED F/u letter sent through [address] [place] [address] [address]. [facility]  [id number]  [id number] [phone]  Dr. Lira  CCT eta [id number]     Efren Jordan, PAUL  03/11/25 212

## 2025-03-13 NOTE — ASSESSMENT & PLAN NOTE
To continue Metfomrin 1000mg twice daily   To continue Glipizide 10mg once daily   Counseled that the goal A1C should be 8% or less  Counseled glycemic control is warranted to prevent microvascular complications  Counseled that the ideal BP is less than 130/80 or lower for diabetics   To continue Atorvastatin 40mg once daily   For follow up in 6 months

## 2025-05-01 ENCOUNTER — APPOINTMENT (OUTPATIENT)
Dept: PODIATRY | Facility: CLINIC | Age: 85
End: 2025-05-01
Payer: MEDICARE

## 2025-05-05 ENCOUNTER — APPOINTMENT (OUTPATIENT)
Dept: GASTROENTEROLOGY | Facility: CLINIC | Age: 85
End: 2025-05-05
Payer: MEDICARE

## 2025-05-19 ENCOUNTER — APPOINTMENT (OUTPATIENT)
Dept: PRIMARY CARE | Facility: CLINIC | Age: 85
End: 2025-05-19
Payer: MEDICARE

## 2025-06-02 ENCOUNTER — APPOINTMENT (OUTPATIENT)
Dept: GERIATRIC MEDICINE | Facility: HOSPITAL | Age: 85
End: 2025-06-02
Payer: MEDICARE

## 2025-07-30 ENCOUNTER — PATIENT MESSAGE (OUTPATIENT)
Dept: PRIMARY CARE | Facility: CLINIC | Age: 85
End: 2025-07-30
Payer: MEDICARE

## 2025-07-31 RX ORDER — QUETIAPINE FUMARATE 25 MG/1
25 TABLET, FILM COATED ORAL NIGHTLY
COMMUNITY

## 2025-07-31 RX ORDER — CETIRIZINE HYDROCHLORIDE, PSEUDOEPHEDRINE HYDROCHLORIDE 5; 120 MG/1; MG/1
2 TABLET, FILM COATED, EXTENDED RELEASE ORAL DAILY PRN
COMMUNITY

## 2025-07-31 RX ORDER — LEVETIRACETAM 750 MG/1
750 TABLET ORAL 2 TIMES DAILY
COMMUNITY

## 2025-07-31 RX ORDER — AMLODIPINE BESYLATE 10 MG/1
10 TABLET ORAL DAILY
COMMUNITY

## 2025-07-31 RX ORDER — RISPERIDONE 0.5 MG/1
0.5 TABLET ORAL NIGHTLY
COMMUNITY

## 2025-08-14 ENCOUNTER — APPOINTMENT (OUTPATIENT)
Dept: PRIMARY CARE | Facility: CLINIC | Age: 85
End: 2025-08-14
Payer: MEDICARE

## 2025-08-14 DIAGNOSIS — F03.90 DEMENTIA WITHOUT BEHAVIORAL DISTURBANCE, PSYCHOTIC DISTURBANCE, MOOD DISTURBANCE, OR ANXIETY, UNSPECIFIED DEMENTIA SEVERITY, UNSPECIFIED DEMENTIA TYPE: ICD-10-CM

## 2025-08-14 DIAGNOSIS — R41.0 CONFUSION: Primary | ICD-10-CM

## 2025-08-25 ENCOUNTER — APPOINTMENT (OUTPATIENT)
Dept: GERIATRIC MEDICINE | Facility: HOSPITAL | Age: 85
End: 2025-08-25
Payer: MEDICARE

## 2025-09-10 ENCOUNTER — APPOINTMENT (OUTPATIENT)
Dept: AUDIOLOGY | Facility: HOSPITAL | Age: 85
End: 2025-09-10
Payer: MEDICARE

## 2025-09-17 ENCOUNTER — APPOINTMENT (OUTPATIENT)
Dept: ENDOCRINOLOGY | Facility: CLINIC | Age: 85
End: 2025-09-17
Payer: MEDICARE

## 2025-10-13 ENCOUNTER — APPOINTMENT (OUTPATIENT)
Dept: GERIATRIC MEDICINE | Facility: HOSPITAL | Age: 85
End: 2025-10-13
Payer: MEDICARE